# Patient Record
Sex: MALE | Employment: OTHER | URBAN - NONMETROPOLITAN AREA
[De-identification: names, ages, dates, MRNs, and addresses within clinical notes are randomized per-mention and may not be internally consistent; named-entity substitution may affect disease eponyms.]

---

## 2017-06-01 ENCOUNTER — HOSPITAL ENCOUNTER (EMERGENCY)
Facility: HOSPITAL | Age: 72
Discharge: HOME | End: 2017-06-01
Attending: EMERGENCY MEDICINE | Admitting: EMERGENCY MEDICINE
Payer: COMMERCIAL

## 2017-06-01 VITALS
DIASTOLIC BLOOD PRESSURE: 86 MMHG | HEART RATE: 70 BPM | TEMPERATURE: 98 F | RESPIRATION RATE: 16 BRPM | SYSTOLIC BLOOD PRESSURE: 135 MMHG | OXYGEN SATURATION: 98 %

## 2017-06-01 DIAGNOSIS — H10.32 ACUTE BACTERIAL CONJUNCTIVITIS OF LEFT EYE: ICD-10-CM

## 2017-06-01 DIAGNOSIS — G44.319 ACUTE POST-TRAUMATIC HEADACHE, NOT INTRACTABLE: ICD-10-CM
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
## 2017-06-01 DIAGNOSIS — S09.90XA CLOSED HEAD INJURY, INITIAL ENCOUNTER: ICD-10-CM

## 2017-06-01 DIAGNOSIS — S06.0X0A CONCUSSION, WITHOUT LOSS OF CONSCIOUSNESS, INITIAL ENCOUNTER: Primary | ICD-10-CM

## 2017-06-01 PROCEDURE — 96372 THER/PROPH/DIAG INJ SC/IM: CPT

## 2017-06-01 PROCEDURE — 70486 CT MAXILLOFACIAL W/O DYE: CPT | Mod: TC

## 2017-06-01 PROCEDURE — 99284 EMERGENCY DEPT VISIT MOD MDM: CPT | Performed by: EMERGENCY MEDICINE

## 2017-06-01 PROCEDURE — 99284 EMERGENCY DEPT VISIT MOD MDM: CPT | Mod: 25

## 2017-06-01 PROCEDURE — 25000128 H RX IP 250 OP 636: Performed by: EMERGENCY MEDICINE

## 2017-06-01 RX ORDER — ERYTHROMYCIN 5 MG/G
1 OINTMENT OPHTHALMIC 4 TIMES DAILY
Qty: 1 TUBE | Refills: 0 | Status: ON HOLD | OUTPATIENT
Start: 2017-06-01 | End: 2022-08-26

## 2017-06-01 RX ORDER — ACETAMINOPHEN AND CODEINE PHOSPHATE 300; 30 MG/1; MG/1
1-2 TABLET ORAL EVERY 6 HOURS PRN
Qty: 20 TABLET | Refills: 0 | Status: ON HOLD | OUTPATIENT
Start: 2017-06-01 | End: 2022-08-26

## 2017-06-01 RX ORDER — KETOROLAC TROMETHAMINE 30 MG/ML
30 INJECTION, SOLUTION INTRAMUSCULAR; INTRAVENOUS ONCE
Status: COMPLETED | OUTPATIENT
Start: 2017-06-01 | End: 2017-06-01

## 2017-06-01 RX ORDER — HYDROCHLOROTHIAZIDE 25 MG/1
25 TABLET ORAL DAILY
COMMUNITY

## 2017-06-01 RX ADMIN — KETOROLAC TROMETHAMINE 30 MG: 30 INJECTION, SOLUTION INTRAMUSCULAR at 09:41

## 2017-06-01 NOTE — ED NOTES
"Pt comes in with reported headache, eye pain on L side.  Pt had fallen in boat and hit head.  Pt had CT, negative at that time.  Reports dx of \"mild concussion\".  Pt L eye is reddened, watery, painful.  MD immediately at bedside.  Denies any vision changes.    "

## 2017-06-01 NOTE — ED AVS SNAPSHOT
[facility]    [address] [place] [id number]    Phone:  [phone]                                       Bill Almodovar   MRN: [id number]    Department:  [facility]   Date of Visit:  6/1/2017           After Visit Summary Signature Page     I have received my discharge instructions, and my questions have been answered. I have discussed any challenges I see with this plan with the nurse or doctor.    ..........................................................................................................................................  Patient/Patient Representative Signature      ..........................................................................................................................................  Patient Representative Print Name and Relationship to Patient    ..................................................               ................................................  Date                                            Time    ..........................................................................................................................................  Reviewed by Signature/Title    ...................................................              ..............................................  Date                                                            Time

## 2017-06-01 NOTE — ED AVS SNAPSHOT
[facility]    [address]    [place] [address]    Phone:  [phone]                                       Bill Almodovar   MRN: [id number]    Department:  [facility]   Date of Visit:  6/1/2017           Patient Information     Date Of Birth          1945        Your diagnoses for this visit were:     Fall, initial encounter     Closed head injury, initial encounter     Acute post-traumatic headache, not intractable     Acute bacterial conjunctivitis of left eye     Concussion, without loss of consciousness, initial encounter        You were seen by Boo Waggoner MD.      Follow-up Information     Follow up with PCP In 1 week.    Why:  Continuity of care        Discharge Instructions         Physical Problems After Brain Injury  Injury to the brain can affect other parts of the body. As a result, patients may have little or no control over their bodies. Muscles may weaken, tighten, or twitch. Patients may develop sensory problems, problems speaking, hand-eye coordination difficulties, and other problems. Some patients may also have physical injuries that occurred along with the brain injury.    Improving posture and motion  Physical therapists help patients regain movement and strength. Improving posture and range-of-motion exercises improve movement. In addition, they help prepare patients to do tasks. For instance, a patient may work on lifting an arm above the head. This may help the patient dress more easily.  You can help    Show interest. Ask the therapist how you can help    Remind the person to use good posture.    Make sure an affected arm or leg is supported in the proper position.  Reducing swallowing problems  If a person has trouble swallowing, a speech therapist may help a patient increase muscle control in the face, mouth, and throat. The patient may also learn to turn or hold the head in a position that makes swallowing easier and safer.  You can  help    Check with a team member before bringing in food or drink. If the person has a swallowing problem, he or she may be on a special diet.    Limit distractions during meals.  Reducing muscle and joint problems  Damage to the brain may tighten muscles or tendons (contracture). Sometimes an injury causes spasms that jerk or twist affected muscles (spasticity). Range-of-motion or stretching exercises may help control these problems. Sometimes casts or splints are used to hold a joint in proper position. Over time, this may relax the muscle. Sometimes surgery is needed to release tight tissue. If you are diagnosed with spasticity, you may benefit from taking medicines that will help relax the muscles.  You can help    Make sure your loved one does any prescribed exercises or stretches daily.    Be sure the splint is on when it needs to be.  Controlling seizures  If too many signals flood the brain, a seizure may occur. Medicines may control these episodes. Keep in mind that if a patient has multiple, unprovoked seizures, he or she should be evaluated for epilepsy.  You can help  If your loved one has a seizure:    Help the person into a safe position. Make sure your loved one will not fall or hit his or her head.    Do not restrain the person or put anything in his or her mouth.    Tell a team or staff member.    9185-7541 [employer]. [address] [place] [address]. All rights reserved. This information is not intended as a substitute for professional medical care. Always follow your healthcare professional's instructions.             Review of your medicines      START taking        Dose / Directions Last dose taken    acetaminophen-codeine 300-30 MG per tablet   Commonly known as:  TYLENOL/codeine #3   Dose:  1-2 tablet   Quantity:  20 tablet        Take 1-2 tablets by mouth every 6 hours as needed for pain   Refills:  0        erythromycin ophthalmic ointment   Commonly known as:   "ROMYCIN   Dose:  1 Application   Quantity:  1 Tube        Place 1 Application into both eyes 4 times daily   Refills:  0          Our records show that you are taking the medicines listed below. If these are incorrect, please call your family doctor or clinic.        Dose / Directions Last dose taken    HYDROCHLOROTHIAZIDE PO   Dose:  25 mg        Take 25 mg by mouth daily   Refills:  0                Prescriptions were sent or printed at these locations (2 Prescriptions)                   [id number] - [place], [place] - [address] AT [facility] [address], [place] [address]    Telephone:  [phone]   Fax:  [phone]   Hours:                  E-Prescribed (1 of 2)         erythromycin (ROMYCIN) ophthalmic ointment                 Printed at Department/Unit printer (1 of 2)         acetaminophen-codeine (TYLENOL/CODEINE #3) 300-30 MG per tablet                Procedures and tests performed during your visit     Maxillofacial  CT w/o contrast      Orders Needing Specimen Collection     None      Pending Results     Date and Time Order Name Status Description    6/1/2017 0937 Maxillofacial  CT w/o contrast Preliminary             Pending Culture Results     No orders found from 5/30/2017 to 6/2/2017.            Thank you for choosing [facility]       Thank you for choosing [facility] for your care. Our goal is always to provide you with excellent care. Hearing back from our patients is one way we can continue to improve our services. Please take a few minutes to complete the written survey that you may receive in the mail after you visit with us. Thank you!        [employer] Information     [employer] lets you send messages to your doctor, view your test results, renew your prescriptions, schedule appointments and more. To sign up, go to [email or web address] . Click on \"Log in\" on the left side of the screen, which will take you to the Welcome page. Then click on \"Sign up Now\" on the " right side of the page.     You will be asked to enter the access code listed below, as well as some personal information. Please follow the directions to create your username and password.     Your access code is: [email or web address]  Expires: 2017 10:23 AM     Your access code will  in 90 days. If you need help or a new code, please call your [facility] clinic or [phone].        Care EveryWhere ID     This is your Care EveryWhere ID. This could be used by other organizations to access your [facility] medical records  [id number]        After Visit Summary       This is your record. Keep this with you and show to your community pharmacist(s) and doctor(s) at your next visit.

## 2017-06-01 NOTE — ED NOTES
Pt presents with c/o a headache. Pt fell and hit his head Friday, [place].

## 2017-06-01 NOTE — ED NOTES
Pt is alert, oriented at discharge.  Verbalized understanding of all discharge instructions.  No questions or concerns.  t given paper script for tylenol 3.  Eye ointment to [facility] in [place].

## 2017-06-01 NOTE — ED PROVIDER NOTES
History     Chief Complaint   Patient presents with     Headache     HPI  Bill Almodovar is a 72 year old male who presents to the ED with 3 days history of left frontal headache and eye pain.  Denies any nausea, vomiting, visual changes.  Patient fell and hit his head against the edge of a boat while fishing.  This happened 6 days ago.  He was seen in the emergency department at [facility] on Monday and CT scan done was negative.  Patient is also complaining of persistent and worsening left frontal headache, eye pain. Now he is also having left eye discharge.  Denies any fever, neck pain.    I have reviewed the Medications, Allergies, Past Medical and Surgical History, and Social History in the Epic system.    Review of Systems   All other systems reviewed and are negative.      Physical Exam   BP: 139/85  Pulse: 72  Temp: 97.5  F (36.4  C)  Resp: 16  SpO2: 99 %  Physical Exam   Constitutional: He is oriented to person, place, and time. He appears well-developed and well-nourished. No distress.   HENT:   Head: Normocephalic and atraumatic.   Mouth/Throat: Oropharynx is clear and moist. No oropharyngeal exudate.   Eyes: EOM are normal. Pupils are equal, round, and reactive to light. Right eye exhibits no discharge. Left eye exhibits discharge. No scleral icterus.   Neck: No thyromegaly present.   Cardiovascular: Normal heart sounds and intact distal pulses.    Pulmonary/Chest: Effort normal and breath sounds normal. No respiratory distress.   Abdominal: Soft. Bowel sounds are normal. He exhibits no mass. There is no tenderness. There is no rebound and no guarding.   Musculoskeletal: He exhibits no edema or tenderness.   Neurological: He is alert and oriented to person, place, and time.   Skin: Skin is warm. No rash noted. He is not diaphoretic.   Nursing note and vitals reviewed.      ED Course   Evaluated and CT maxillofacial ordered.  Toradol IM given for headache.  10:15 AM-normal CT of    Maxillofacial bones    ED Course     Procedures       Labs Ordered and Resulted from Time of ED Arrival Up to the Time of Departure from the ED - No data to display    Assessments & Plan (with Medical Decision Making)     I have reviewed the nursing notes.    I have reviewed the findings, diagnosis, plan and need for follow up with the patient.    New Prescriptions    ACETAMINOPHEN-CODEINE (TYLENOL/CODEINE #3) 300-30 MG PER TABLET    Take 1-2 tablets by mouth every 6 hours as needed for pain    ERYTHROMYCIN (ROMYCIN) OPHTHALMIC OINTMENT    Place 1 Application into both eyes 4 times daily       Final diagnoses:   Fall, initial encounter   Closed head injury, initial encounter   Acute post-traumatic headache, not intractable   Acute bacterial conjunctivitis of left eye   Concussion, without loss of consciousness, initial encounter   The headache is most likely secondary to concussion, patient discharged home.  Advised continue Tylenol 3 as needed for pain.  Also discharged home on erythromycin eye ointment.    6/1/2017   [facility]     Boo Waggoner MD  06/01/17 8429

## 2017-06-01 NOTE — DISCHARGE INSTRUCTIONS
Physical Problems After Brain Injury  Injury to the brain can affect other parts of the body. As a result, patients may have little or no control over their bodies. Muscles may weaken, tighten, or twitch. Patients may develop sensory problems, problems speaking, hand-eye coordination difficulties, and other problems. Some patients may also have physical injuries that occurred along with the brain injury.    Improving posture and motion  Physical therapists help patients regain movement and strength. Improving posture and range-of-motion exercises improve movement. In addition, they help prepare patients to do tasks. For instance, a patient may work on lifting an arm above the head. This may help the patient dress more easily.  You can help    Show interest. Ask the therapist how you can help    Remind the person to use good posture.    Make sure an affected arm or leg is supported in the proper position.  Reducing swallowing problems  If a person has trouble swallowing, a speech therapist may help a patient increase muscle control in the face, mouth, and throat. The patient may also learn to turn or hold the head in a position that makes swallowing easier and safer.  You can help    Check with a team member before bringing in food or drink. If the person has a swallowing problem, he or she may be on a special diet.    Limit distractions during meals.  Reducing muscle and joint problems  Damage to the brain may tighten muscles or tendons (contracture). Sometimes an injury causes spasms that jerk or twist affected muscles (spasticity). Range-of-motion or stretching exercises may help control these problems. Sometimes casts or splints are used to hold a joint in proper position. Over time, this may relax the muscle. Sometimes surgery is needed to release tight tissue. If you are diagnosed with spasticity, you may benefit from taking medicines that will help relax the muscles.  You can help    Make sure your loved one  does any prescribed exercises or stretches daily.    Be sure the splint is on when it needs to be.  Controlling seizures  If too many signals flood the brain, a seizure may occur. Medicines may control these episodes. Keep in mind that if a patient has multiple, unprovoked seizures, he or she should be evaluated for epilepsy.  You can help  If your loved one has a seizure:    Help the person into a safe position. Make sure your loved one will not fall or hit his or her head.    Do not restrain the person or put anything in his or her mouth.    Tell a team or staff member.    0756-9159 [employer]. [address] [place] [address], [place] [address]. All rights reserved. This information is not intended as a substitute for professional medical care. Always follow your healthcare professional's instructions.

## 2021-11-22 ENCOUNTER — APPOINTMENT (OUTPATIENT)
Dept: URBAN - METROPOLITAN AREA CLINIC 259 | Age: 76
Setting detail: DERMATOLOGY
End: 2021-11-23

## 2021-11-22 DIAGNOSIS — L57.0 ACTINIC KERATOSIS: ICD-10-CM

## 2021-11-22 DIAGNOSIS — Z85.828 PERSONAL HISTORY OF OTHER MALIGNANT NEOPLASM OF SKIN: ICD-10-CM

## 2021-11-22 DIAGNOSIS — L57.8 OTHER SKIN CHANGES DUE TO CHRONIC EXPOSURE TO NONIONIZING RADIATION: ICD-10-CM

## 2021-11-22 PROCEDURE — OTHER COUNSELING: OTHER

## 2021-11-22 PROCEDURE — 99204 OFFICE O/P NEW MOD 45 MIN: CPT | Mod: 25

## 2021-11-22 PROCEDURE — 17000 DESTRUCT PREMALG LESION: CPT

## 2021-11-22 PROCEDURE — OTHER MIPS QUALITY: OTHER

## 2021-11-22 PROCEDURE — OTHER PRESCRIPTION: OTHER

## 2021-11-22 PROCEDURE — OTHER LIQUID NITROGEN: OTHER

## 2021-11-22 PROCEDURE — 17003 DESTRUCT PREMALG LES 2-14: CPT

## 2021-11-22 RX ORDER — FLUOROURACIL 2 G/40G
CREAM TOPICAL BID
Qty: 30 | Refills: 0 | Status: ERX | COMMUNITY
Start: 2021-11-22

## 2021-11-22 ASSESSMENT — LOCATION DETAILED DESCRIPTION DERM
LOCATION DETAILED: LEFT MEDIAL MALAR CHEEK
LOCATION DETAILED: LEFT SCAPHA
LOCATION DETAILED: RIGHT SUPERIOR UPPER BACK
LOCATION DETAILED: LEFT CENTRAL MALAR CHEEK

## 2021-11-22 ASSESSMENT — LOCATION SIMPLE DESCRIPTION DERM
LOCATION SIMPLE: LEFT EAR
LOCATION SIMPLE: LEFT CHEEK
LOCATION SIMPLE: RIGHT UPPER BACK

## 2021-11-22 ASSESSMENT — LOCATION ZONE DERM
LOCATION ZONE: TRUNK
LOCATION ZONE: EAR
LOCATION ZONE: FACE

## 2021-11-22 NOTE — PROCEDURE: LIQUID NITROGEN
Consent: The patient's consent was obtained including but not limited to risks of crusting, scabbing, blistering, scarring, darker or lighter pigmentary change, recurrence, incomplete removal and infection.
Duration Of Freeze Thaw-Cycle (Seconds): 0
Render Post-Care Instructions In Note?: yes
Render In Bullet Format When Appropriate: No
Post-Care Instructions: I reviewed with the patient in detail post-care instructions. Patient is to wear sunprotection, and avoid picking at any of the treated lesions. Pt may apply Vaseline to crusted or scabbing areas.
Detail Level: Zone
Total Number Of Aks Treated: 4

## 2022-04-20 ENCOUNTER — APPOINTMENT (OUTPATIENT)
Dept: URBAN - METROPOLITAN AREA CLINIC 259 | Age: 77
Setting detail: DERMATOLOGY
End: 2022-04-21

## 2022-04-20 DIAGNOSIS — L57.0 ACTINIC KERATOSIS: ICD-10-CM

## 2022-04-20 DIAGNOSIS — L57.8 OTHER SKIN CHANGES DUE TO CHRONIC EXPOSURE TO NONIONIZING RADIATION: ICD-10-CM

## 2022-04-20 PROCEDURE — OTHER COUNSELING: OTHER

## 2022-04-20 PROCEDURE — 17003 DESTRUCT PREMALG LES 2-14: CPT

## 2022-04-20 PROCEDURE — 17000 DESTRUCT PREMALG LESION: CPT

## 2022-04-20 PROCEDURE — OTHER MIPS QUALITY: OTHER

## 2022-04-20 PROCEDURE — OTHER LIQUID NITROGEN: OTHER

## 2022-04-20 PROCEDURE — 99212 OFFICE O/P EST SF 10 MIN: CPT | Mod: 25

## 2022-04-20 ASSESSMENT — LOCATION DETAILED DESCRIPTION DERM
LOCATION DETAILED: MID-FRONTAL SCALP
LOCATION DETAILED: LEFT CENTRAL MALAR CHEEK

## 2022-04-20 ASSESSMENT — LOCATION SIMPLE DESCRIPTION DERM
LOCATION SIMPLE: ANTERIOR SCALP
LOCATION SIMPLE: LEFT CHEEK

## 2022-04-20 ASSESSMENT — LOCATION ZONE DERM
LOCATION ZONE: FACE
LOCATION ZONE: SCALP

## 2022-04-20 NOTE — PROCEDURE: LIQUID NITROGEN
Total Number Of Aks Treated: 10
Duration Of Freeze Thaw-Cycle (Seconds): 0
Consent: The patient's consent was obtained including but not limited to risks of crusting, scabbing, blistering, scarring, darker or lighter pigmentary change, recurrence, incomplete removal and infection.
Render In Bullet Format When Appropriate: No
Post-Care Instructions: I reviewed with the patient in detail post-care instructions. Patient is to wear sunprotection, and avoid picking at any of the treated lesions. Pt may apply Vaseline to crusted or scabbing areas.
Render Post-Care Instructions In Note?: yes
Detail Level: Zone

## 2022-04-20 NOTE — PROCEDURE: MIPS QUALITY
Detail Level: Detailed
Quality 130: Documentation Of Current Medications In The Medical Record: Current Medications Documented
Quality 110: Preventive Care And Screening: Influenza Immunization: Influenza Immunization Ordered or Recommended, but not Administered due to system reason
Quality 431: Preventive Care And Screening: Unhealthy Alcohol Use - Screening: Patient screened for unhealthy alcohol use using a single question and scores less than 2 times per year
Quality 226: Preventive Care And Screening: Tobacco Use: Screening And Cessation Intervention: Patient screened for tobacco use and is an ex/non-smoker
Quality 111:Pneumonia Vaccination Status For Older Adults: Pneumococcal Vaccination not Administered or Previously Received, Reason not Otherwise Specified

## 2022-07-21 ENCOUNTER — APPOINTMENT (OUTPATIENT)
Dept: URBAN - METROPOLITAN AREA CLINIC 259 | Age: 77
Setting detail: DERMATOLOGY
End: 2022-07-21

## 2022-07-21 DIAGNOSIS — Z85.828 PERSONAL HISTORY OF OTHER MALIGNANT NEOPLASM OF SKIN: ICD-10-CM

## 2022-07-21 DIAGNOSIS — L57.0 ACTINIC KERATOSIS: ICD-10-CM

## 2022-07-21 DIAGNOSIS — L72.0 EPIDERMAL CYST: ICD-10-CM

## 2022-07-21 DIAGNOSIS — L57.8 OTHER SKIN CHANGES DUE TO CHRONIC EXPOSURE TO NONIONIZING RADIATION: ICD-10-CM

## 2022-07-21 PROCEDURE — OTHER COUNSELING: OTHER

## 2022-07-21 PROCEDURE — OTHER SUNSCREEN RECOMMENDATIONS: OTHER

## 2022-07-21 PROCEDURE — OTHER LIQUID NITROGEN: OTHER

## 2022-07-21 PROCEDURE — OTHER EXTRACTIONS: OTHER

## 2022-07-21 PROCEDURE — OTHER MIPS QUALITY: OTHER

## 2022-07-21 PROCEDURE — 17003 DESTRUCT PREMALG LES 2-14: CPT

## 2022-07-21 PROCEDURE — 99213 OFFICE O/P EST LOW 20 MIN: CPT | Mod: 25

## 2022-07-21 PROCEDURE — 17000 DESTRUCT PREMALG LESION: CPT

## 2022-07-21 ASSESSMENT — LOCATION ZONE DERM
LOCATION ZONE: SCALP
LOCATION ZONE: FACE
LOCATION ZONE: EYELID

## 2022-07-21 ASSESSMENT — LOCATION DETAILED DESCRIPTION DERM
LOCATION DETAILED: RIGHT MEDIAL FRONTAL SCALP
LOCATION DETAILED: RIGHT SUPERIOR PARIETAL SCALP
LOCATION DETAILED: LEFT CENTRAL MALAR CHEEK
LOCATION DETAILED: RIGHT LATERAL FOREHEAD
LOCATION DETAILED: RIGHT LATERAL CANTHUS
LOCATION DETAILED: LEFT SUPERIOR FOREHEAD
LOCATION DETAILED: RIGHT FOREHEAD
LOCATION DETAILED: LEFT FOREHEAD

## 2022-07-21 ASSESSMENT — LOCATION SIMPLE DESCRIPTION DERM
LOCATION SIMPLE: RIGHT EYELID
LOCATION SIMPLE: RIGHT FOREHEAD
LOCATION SIMPLE: LEFT FOREHEAD
LOCATION SIMPLE: RIGHT SCALP
LOCATION SIMPLE: SCALP
LOCATION SIMPLE: LEFT CHEEK

## 2022-07-21 NOTE — PROCEDURE: EXTRACTIONS
Post-Care Instructions: I reviewed with the patient in detail post-care instructions. Patient is to keep the treatment areas dry overnight, and then apply bacitracin twice daily until healed. Patient may apply hydrogen peroxide soaks to remove any crusting.
Render Post-Care Instructions In Note?: no
Prep Text (Optional): Prior to removal the treatment areas were prepped in the usual fashion.
Acne Type: A milial cyst
Detail Level: Simple
Render Number Of Lesions Treated: yes
Consent was obtained and risks were reviewed including but not limited to scarring, infection, bleeding, scabbing, incomplete removal, and allergy to anesthesia.
Extraction Method: 11 blade

## 2022-07-21 NOTE — PROCEDURE: LIQUID NITROGEN
Render Post-Care Instructions In Note?: no
Total Number Of Aks Treated: 5
Duration Of Freeze Thaw-Cycle (Seconds): 0
Post-Care Instructions: I reviewed with the patient in detail post-care instructions. Patient is to wear sunprotection, and avoid picking at any of the treated lesions. Pt may apply Vaseline to crusted or scabbing areas.
Number Of Freeze-Thaw Cycles: 1 freeze-thaw cycle
Detail Level: Zone
Consent: The patient's consent was obtained including but not limited to risks of crusting, scabbing, blistering, scarring, darker or lighter pigmentary change, recurrence, incomplete removal and infection.

## 2022-07-21 NOTE — PROCEDURE: MIPS QUALITY
Quality 431: Preventive Care And Screening: Unhealthy Alcohol Use - Screening: Patient screened for unhealthy alcohol use using a single question and scores less than 2 times per year
Quality 111:Pneumonia Vaccination Status For Older Adults: Pneumococcal Vaccination not Administered or Previously Received, Reason not Otherwise Specified
Quality 130: Documentation Of Current Medications In The Medical Record: Current Medications Documented
Detail Level: Detailed
Quality 226: Preventive Care And Screening: Tobacco Use: Screening And Cessation Intervention: Patient screened for tobacco use and is an ex/non-smoker
Quality 110: Preventive Care And Screening: Influenza Immunization: Influenza Immunization Ordered or Recommended, but not Administered due to system reason

## 2022-07-21 NOTE — PROCEDURE: COUNSELING
Detail Level: Zone
Detail Level: Generalized
Patient Specific Counseling (Will Not Stick From Patient To Patient): \\nPatient has some post inflammatory hyper and hypo pigmentation.  We discussed that this is due to a combination of sun damage, the effects of multiple treatments with liquid nitrogen, and likely some scar tissue from trauma (he notes that he bumps his head frequently).

## 2022-08-25 ENCOUNTER — APPOINTMENT (OUTPATIENT)
Dept: GENERAL RADIOLOGY | Facility: HOSPITAL | Age: 77
End: 2022-08-25
Attending: EMERGENCY MEDICINE
Payer: COMMERCIAL

## 2022-08-25 ENCOUNTER — APPOINTMENT (OUTPATIENT)
Dept: CT IMAGING | Facility: HOSPITAL | Age: 77
End: 2022-08-25
Attending: EMERGENCY MEDICINE
Payer: COMMERCIAL

## 2022-08-25 ENCOUNTER — HOSPITAL ENCOUNTER (OUTPATIENT)
Facility: HOSPITAL | Age: 77
Setting detail: OBSERVATION
Discharge: HOME | End: 2022-08-27
Attending: EMERGENCY MEDICINE | Admitting: INTERNAL MEDICINE
Payer: COMMERCIAL

## 2022-08-25 DIAGNOSIS — S22.038A OTHER CLOSED FRACTURE OF THIRD THORACIC VERTEBRA, INITIAL ENCOUNTER (H): ICD-10-CM

## 2022-08-25 DIAGNOSIS — S22.028A OTHER CLOSED FRACTURE OF SECOND THORACIC VERTEBRA, INITIAL ENCOUNTER (H): ICD-10-CM

## 2022-08-25 LAB
ALBUMIN SERPL-MCNC: 3.7 G/DL (ref 3.4–5)
ALP SERPL-CCNC: 53 U/L (ref 40–150)
ALT SERPL W P-5'-P-CCNC: 25 U/L (ref 0–70)
ANION GAP SERPL CALCULATED.3IONS-SCNC: 10 MMOL/L (ref 3–14)
APTT PPP: 26 SECONDS (ref 22–38)
AST SERPL W P-5'-P-CCNC: 32 U/L (ref 0–45)
BASOPHILS # BLD AUTO: 0 10E3/UL (ref 0–0.2)
BASOPHILS NFR BLD AUTO: 0 %
BILIRUB SERPL-MCNC: 1 MG/DL (ref 0.2–1.3)
BUN SERPL-MCNC: 17 MG/DL (ref 7–30)
CALCIUM SERPL-MCNC: 9.7 MG/DL (ref 8.5–10.1)
CHLORIDE BLD-SCNC: 105 MMOL/L (ref 94–109)
CO2 SERPL-SCNC: 24 MMOL/L (ref 20–32)
CREAT SERPL-MCNC: 1.19 MG/DL (ref 0.66–1.25)
EOSINOPHIL # BLD AUTO: 0.2 10E3/UL (ref 0–0.7)
EOSINOPHIL NFR BLD AUTO: 1 %
ERYTHROCYTE [DISTWIDTH] IN BLOOD BY AUTOMATED COUNT: 13.2 % (ref 10–15)
ETHANOL SERPL-MCNC: <0.01 G/DL
GFR SERPL CREATININE-BSD FRML MDRD: 64 ML/MIN/1.73M2
GLUCOSE BLD-MCNC: 137 MG/DL (ref 70–99)
HCT VFR BLD AUTO: 45.2 % (ref 40–53)
HGB BLD-MCNC: 16.2 G/DL (ref 13.3–17.7)
HOLD SPECIMEN: NORMAL
HOLD SPECIMEN: NORMAL
IMM GRANULOCYTES # BLD: 0.1 10E3/UL
IMM GRANULOCYTES NFR BLD: 1 %
INR PPP: 1 (ref 0.85–1.15)
LYMPHOCYTES # BLD AUTO: 1.2 10E3/UL (ref 0.8–5.3)
LYMPHOCYTES NFR BLD AUTO: 9 %
MCH RBC QN AUTO: 33.3 PG (ref 26.5–33)
MCHC RBC AUTO-ENTMCNC: 35.8 G/DL (ref 31.5–36.5)
MCV RBC AUTO: 93 FL (ref 78–100)
MONOCYTES # BLD AUTO: 0.8 10E3/UL (ref 0–1.3)
MONOCYTES NFR BLD AUTO: 6 %
NEUTROPHILS # BLD AUTO: 10.8 10E3/UL (ref 1.6–8.3)
NEUTROPHILS NFR BLD AUTO: 83 %
NRBC # BLD AUTO: 0 10E3/UL
NRBC BLD AUTO-RTO: 0 /100
PLATELET # BLD AUTO: 357 10E3/UL (ref 150–450)
POTASSIUM BLD-SCNC: 3 MMOL/L (ref 3.4–5.3)
PROT SERPL-MCNC: 7.4 G/DL (ref 6.8–8.8)
RBC # BLD AUTO: 4.86 10E6/UL (ref 4.4–5.9)
SARS-COV-2 RNA RESP QL NAA+PROBE: NEGATIVE
SODIUM SERPL-SCNC: 139 MMOL/L (ref 133–144)
WBC # BLD AUTO: 13.2 10E3/UL (ref 4–11)

## 2022-08-25 PROCEDURE — 250N000011 HC RX IP 250 OP 636: Performed by: EMERGENCY MEDICINE

## 2022-08-25 PROCEDURE — 82077 ASSAY SPEC XCP UR&BREATH IA: CPT | Performed by: EMERGENCY MEDICINE

## 2022-08-25 PROCEDURE — 36415 COLL VENOUS BLD VENIPUNCTURE: CPT | Performed by: EMERGENCY MEDICINE

## 2022-08-25 PROCEDURE — 82040 ASSAY OF SERUM ALBUMIN: CPT | Performed by: EMERGENCY MEDICINE

## 2022-08-25 PROCEDURE — 72128 CT CHEST SPINE W/O DYE: CPT

## 2022-08-25 PROCEDURE — 72125 CT NECK SPINE W/O DYE: CPT

## 2022-08-25 PROCEDURE — 72170 X-RAY EXAM OF PELVIS: CPT

## 2022-08-25 PROCEDURE — 250N000013 HC RX MED GY IP 250 OP 250 PS 637: Performed by: INTERNAL MEDICINE

## 2022-08-25 PROCEDURE — 96366 THER/PROPH/DIAG IV INF ADDON: CPT | Mod: XU

## 2022-08-25 PROCEDURE — C9803 HOPD COVID-19 SPEC COLLECT: HCPCS

## 2022-08-25 PROCEDURE — 73090 X-RAY EXAM OF FOREARM: CPT | Mod: RT

## 2022-08-25 PROCEDURE — 99219 PR INITIAL OBSERVATION CARE,LEVEL II: CPT | Mod: AI | Performed by: INTERNAL MEDICINE

## 2022-08-25 PROCEDURE — 96375 TX/PRO/DX INJ NEW DRUG ADDON: CPT

## 2022-08-25 PROCEDURE — 36415 COLL VENOUS BLD VENIPUNCTURE: CPT | Performed by: INTERNAL MEDICINE

## 2022-08-25 PROCEDURE — 250N000013 HC RX MED GY IP 250 OP 250 PS 637: Performed by: EMERGENCY MEDICINE

## 2022-08-25 PROCEDURE — 93010 ELECTROCARDIOGRAM REPORT: CPT | Performed by: INTERNAL MEDICINE

## 2022-08-25 PROCEDURE — 85730 THROMBOPLASTIN TIME PARTIAL: CPT | Performed by: EMERGENCY MEDICINE

## 2022-08-25 PROCEDURE — 70450 CT HEAD/BRAIN W/O DYE: CPT

## 2022-08-25 PROCEDURE — 12032 INTMD RPR S/A/T/EXT 2.6-7.5: CPT

## 2022-08-25 PROCEDURE — 84100 ASSAY OF PHOSPHORUS: CPT | Performed by: INTERNAL MEDICINE

## 2022-08-25 PROCEDURE — 83735 ASSAY OF MAGNESIUM: CPT | Performed by: INTERNAL MEDICINE

## 2022-08-25 PROCEDURE — 85610 PROTHROMBIN TIME: CPT | Performed by: EMERGENCY MEDICINE

## 2022-08-25 PROCEDURE — 99285 EMERGENCY DEPT VISIT HI MDM: CPT | Mod: 25

## 2022-08-25 PROCEDURE — 85025 COMPLETE CBC W/AUTO DIFF WBC: CPT | Performed by: EMERGENCY MEDICINE

## 2022-08-25 PROCEDURE — U0005 INFEC AGEN DETEC AMPLI PROBE: HCPCS | Performed by: EMERGENCY MEDICINE

## 2022-08-25 PROCEDURE — 80053 COMPREHEN METABOLIC PANEL: CPT | Performed by: EMERGENCY MEDICINE

## 2022-08-25 PROCEDURE — 96365 THER/PROPH/DIAG IV INF INIT: CPT | Mod: XU

## 2022-08-25 PROCEDURE — 99291 CRITICAL CARE FIRST HOUR: CPT | Mod: 25 | Performed by: EMERGENCY MEDICINE

## 2022-08-25 PROCEDURE — 12032 INTMD RPR S/A/T/EXT 2.6-7.5: CPT | Performed by: EMERGENCY MEDICINE

## 2022-08-25 PROCEDURE — 71045 X-RAY EXAM CHEST 1 VIEW: CPT

## 2022-08-25 PROCEDURE — 684N000002 HC FULL TRAUMA W/O CC LEVEL IV

## 2022-08-25 RX ORDER — ONDANSETRON 4 MG/1
4 TABLET, ORALLY DISINTEGRATING ORAL EVERY 6 HOURS PRN
Status: DISCONTINUED | OUTPATIENT
Start: 2022-08-25 | End: 2022-08-27 | Stop reason: HOSPADM

## 2022-08-25 RX ORDER — ACETAMINOPHEN 10 MG/ML
1000 INJECTION, SOLUTION INTRAVENOUS ONCE
Status: COMPLETED | OUTPATIENT
Start: 2022-08-25 | End: 2022-08-25

## 2022-08-25 RX ORDER — AMOXICILLIN 250 MG
1 CAPSULE ORAL 2 TIMES DAILY PRN
Status: DISCONTINUED | OUTPATIENT
Start: 2022-08-25 | End: 2022-08-27 | Stop reason: HOSPADM

## 2022-08-25 RX ORDER — ONDANSETRON 2 MG/ML
4 INJECTION INTRAMUSCULAR; INTRAVENOUS EVERY 6 HOURS PRN
Status: DISCONTINUED | OUTPATIENT
Start: 2022-08-25 | End: 2022-08-27 | Stop reason: HOSPADM

## 2022-08-25 RX ORDER — AMOXICILLIN 250 MG
2 CAPSULE ORAL 2 TIMES DAILY PRN
Status: DISCONTINUED | OUTPATIENT
Start: 2022-08-25 | End: 2022-08-27 | Stop reason: HOSPADM

## 2022-08-25 RX ORDER — FENTANYL CITRATE 50 UG/ML
50 INJECTION, SOLUTION INTRAMUSCULAR; INTRAVENOUS ONCE
Status: COMPLETED | OUTPATIENT
Start: 2022-08-25 | End: 2022-08-25

## 2022-08-25 RX ORDER — HYDROMORPHONE HCL IN WATER/PF 6 MG/30 ML
0.2 PATIENT CONTROLLED ANALGESIA SYRINGE INTRAVENOUS
Status: DISCONTINUED | OUTPATIENT
Start: 2022-08-25 | End: 2022-08-26

## 2022-08-25 RX ORDER — ACETAMINOPHEN 325 MG/1
650 TABLET ORAL EVERY 6 HOURS PRN
Status: DISCONTINUED | OUTPATIENT
Start: 2022-08-25 | End: 2022-08-27 | Stop reason: HOSPADM

## 2022-08-25 RX ORDER — POTASSIUM CHLORIDE 20MEQ/15ML
40 LIQUID (ML) ORAL ONCE
Status: COMPLETED | OUTPATIENT
Start: 2022-08-25 | End: 2022-08-25

## 2022-08-25 RX ORDER — ACETAMINOPHEN 325 MG/1
975 TABLET ORAL ONCE
Status: COMPLETED | OUTPATIENT
Start: 2022-08-25 | End: 2022-08-25

## 2022-08-25 RX ORDER — ACETAMINOPHEN 650 MG/1
650 SUPPOSITORY RECTAL EVERY 6 HOURS PRN
Status: DISCONTINUED | OUTPATIENT
Start: 2022-08-25 | End: 2022-08-27 | Stop reason: HOSPADM

## 2022-08-25 RX ORDER — HYDROMORPHONE HYDROCHLORIDE 1 MG/ML
0.5 INJECTION, SOLUTION INTRAMUSCULAR; INTRAVENOUS; SUBCUTANEOUS ONCE
Status: COMPLETED | OUTPATIENT
Start: 2022-08-25 | End: 2022-08-25

## 2022-08-25 RX ORDER — AMOXICILLIN 250 MG
1 CAPSULE ORAL 2 TIMES DAILY
Status: DISCONTINUED | OUTPATIENT
Start: 2022-08-26 | End: 2022-08-25

## 2022-08-25 RX ORDER — OXYCODONE HYDROCHLORIDE 5 MG/1
5 TABLET ORAL ONCE
Status: COMPLETED | OUTPATIENT
Start: 2022-08-25 | End: 2022-08-25

## 2022-08-25 RX ORDER — AMOXICILLIN 250 MG
2 CAPSULE ORAL 2 TIMES DAILY
Status: DISCONTINUED | OUTPATIENT
Start: 2022-08-26 | End: 2022-08-25

## 2022-08-25 RX ORDER — MAGNESIUM HYDROXIDE/ALUMINUM HYDROXICE/SIMETHICONE 120; 1200; 1200 MG/30ML; MG/30ML; MG/30ML
30 SUSPENSION ORAL ONCE
Status: COMPLETED | OUTPATIENT
Start: 2022-08-25 | End: 2022-08-25

## 2022-08-25 RX ADMIN — OXYCODONE HYDROCHLORIDE 5 MG: 5 TABLET ORAL at 19:46

## 2022-08-25 RX ADMIN — FENTANYL CITRATE 50 MCG: 50 INJECTION, SOLUTION INTRAMUSCULAR; INTRAVENOUS at 15:21

## 2022-08-25 RX ADMIN — POTASSIUM CHLORIDE 40 MEQ: 20 SOLUTION ORAL at 19:45

## 2022-08-25 RX ADMIN — ACETAMINOPHEN 1000 MG: 10 INJECTION INTRAVENOUS at 15:23

## 2022-08-25 RX ADMIN — ALUMINUM HYDROXIDE, MAGNESIUM HYDROXIDE, AND SIMETHICONE 30 ML: 200; 200; 20 SUSPENSION ORAL at 21:50

## 2022-08-25 RX ADMIN — ACETAMINOPHEN 325MG 975 MG: 325 TABLET ORAL at 19:45

## 2022-08-25 RX ADMIN — HYDROMORPHONE HYDROCHLORIDE 0.5 MG: 1 INJECTION, SOLUTION INTRAMUSCULAR; INTRAVENOUS; SUBCUTANEOUS at 17:34

## 2022-08-25 ASSESSMENT — ACTIVITIES OF DAILY LIVING (ADL)
ADLS_ACUITY_SCORE: 20
ADLS_ACUITY_SCORE: 35

## 2022-08-25 NOTE — ED NOTES
Can you please enter an order for Dermatology, patient has follow up appt in march    Thank you Went in to talk with patient and wife, wife is worried as she states he's never in pain and he's having pain. Neck brace was removed by provider at some point when she was in

## 2022-08-25 NOTE — ED PROVIDER NOTES
History     Chief Complaint   Patient presents with     Trauma     HPI  Bill Almodovar is a 77 year old male who presents with head injury.  Family member was cutting branches from a tree and a branch fell on his head.  The patient does not remember the event and does not recall where he was hit or how he landed.  He reports pain in his head with a large laceration/avulsion of the scalp as well as pain in his upper back between his shoulder blades.  He was brought in by EMS on a backboard, trauma activation was called for elderly head trauma with amnesia.  He is not on any blood thinners.  He has no nausea or vomiting.  He has no numbness or weakness.  He has avulsions on his right forearm.  He otherwise denies any limb pain, chest pain, abdominal pain. Last Td 2020    Allergies:  No Known Allergies    Problem List:    Patient Active Problem List    Diagnosis Date Noted     Head trauma, initial encounter 08/26/2022     Priority: Medium     Syncope, unspecified syncope type 08/26/2022     Priority: Medium     Other closed fracture of third thoracic vertebra, initial encounter (H) 08/25/2022     Priority: Medium     Other closed fracture of second thoracic vertebra, initial encounter (H) 08/25/2022     Priority: Medium        Past Medical History:    No past medical history on file.    Past Surgical History:    No past surgical history on file.    Family History:    No family history on file.    Social History:  Marital Status:   [2]        Medications:    acetaminophen (TYLENOL) 325 MG tablet  hydrochlorothiazide (HYDRODIURIL) 25 MG tablet  Lidocaine (LIDOCARE) 4 % Patch  oxyCODONE (ROXICODONE) 5 MG tablet          Review of Systems   Constitutional: Negative for fever.   HENT: Negative for nosebleeds.    Eyes: Negative for visual disturbance.   Respiratory: Negative for shortness of breath.    Cardiovascular: Negative for chest pain.   Gastrointestinal: Negative for vomiting.   Musculoskeletal: Positive  "for neck pain.   Skin: Positive for wound.   Neurological: Positive for headaches. Negative for weakness and numbness.   Psychiatric/Behavioral: Positive for confusion. Negative for agitation.       Physical Exam   BP: 142/84  Pulse: 81  Temp: 97.8  F (36.6  C)  Resp: 22  Height: 177.8 cm (5' 10\") (per chart review)  Weight: 69.1 kg (152 lb 5.4 oz)  SpO2: 97 %      Physical Exam  Constitutional:       General: He is in acute distress.   HENT:      Head:      Comments: Large avulsion of the skin of the scalp, approximately 7 cm in diameter.  Linear gouged area through subcutaneous tissue to the galea does not violate galea.    Musculoskeletal:      Comments: Mid thoracic midline back tenderness   Skin:     General: Skin is warm and dry.      Comments: Skin tears right arm   Neurological:      Mental Status: He is alert and oriented to person, place, and time.      Comments: Cranial nerves grossly intact.  Moving all extremities with good strength.  Sensation intact to all extremities.         ED Course              ED Course as of 08/30/22 [address] [place] [address] Aug 25, 2022   [phone] Fax: [phone]   Dr Paiz: Tend not to brace endplate fracture, though may benefit if pain is severe, generally attempt to mobilize as tolerate.  If unable to tolerate pain can either admit to hospitalist for pain control or transfer to ED in [place] for nonemergent trauma     [facility]    -Laceration Repair    Date/Time: 8/25/2022 8:11 PM  Performed by: Gris Adams MD  Authorized by: Gris Adams MD     Risks, benefits and alternatives discussed.      ANESTHESIA (see MAR for exact dosages):     Anesthesia method:  Local infiltration    Local anesthetic:  Lidocaine 1% WITH epi  LACERATION DETAILS     Location:  Scalp    Scalp location:  L parietal    Length (cm):  4    Depth (mm):  4    REPAIR TYPE:     Repair type:  Intermediate      EXPLORATION:     Hemostasis achieved with:  Epinephrine and direct pressure    Wound " exploration: wound explored through full range of motion and entire depth of wound probed and visualized      TREATMENT:     Area cleansed with:  Soap and water    Amount of cleaning:  Extensive    Irrigation solution:  Sterile saline    Irrigation volume:  1L     Irrigation method: rinse.    Visualized foreign bodies/material removed: yes      SUBCUTANEOUS REPAIR     Suture size:  3-0    Suture material:  Vicryl    Suture technique:  Horizontal mattress and running    Number of sutures:  13    POST-PROCEDURE DETAILS     Dressing: vaseline gauze, dry gauze, paper tape.        PROCEDURE  Describe Procedure: Large avulsion on scalp, crown and occipital/parietal area.  There is a approximately 4 cm linear area of subcutaneous tissue loss exposing the galea of the skull.  This thread of subcutaneous tissue was adherent to the base of this area, devitalized, and so was trimmed away.  2 horizontal mattress sutures were required to bridge this gap with reduced tension.  Caps were then filled in with running sutures.  Actually obtained good approximation though overlying epidermis is avulsed.  Avulsion at the crown of the head was cleaned and flaps of epidermis were placed over the subcutaneous tissue.  The entire injury was covered with Vaseline gauze and dry gauze and then paper tape.  Patient tolerated the procedure well.  Patient Tolerance:  Patient tolerated the procedure well with no immediate complications                Critical Care time:  was 35 minutes for this patient excluding procedures.  Trauma:  Level of trauma activation: Full  Full Primary and Secondary survey with appropriate immobilization of spine completed in exam section.  C-collar and immobilization: applied prior to arrival.  CSpine Clearance: Patient left in collar  GCS at arrival: 15  GCS at disposition: unchanged  Consults prior to admission or transfer: Orthopedics called  Procedures done in the ED: Laceration repair  Disposition: Admit.  Admission ordered at 910 PM            No results found for this or any previous visit (from the past 24 hour(s)).    Medications   fentaNYL (PF) (SUBLIMAZE) injection 50 mcg (50 mcg Intravenous Given 8/25/22 1521)   acetaminophen (OFIRMEV) infusion 1,000 mg (0 mg Intravenous Stopped 8/25/22 1712)   HYDROmorphone (PF) (DILAUDID) injection 0.5 mg (0.5 mg Intravenous Given 8/25/22 1734)   oxyCODONE (ROXICODONE) tablet 5 mg (5 mg Oral Given 8/25/22 1946)   acetaminophen (TYLENOL) tablet 975 mg (975 mg Oral Given 8/25/22 1945)   potassium chloride (KAYCIEL) solution 40 mEq (40 mEq Oral Given 8/25/22 1945)   alum & mag hydroxide-simethicone (MAALOX) suspension 30 mL (30 mLs Oral Given 8/25/22 2150)   ketorolac (TORADOL) injection 30 mg (30 mg Intramuscular Given 8/27/22 1116)       Assessments & Plan (with Medical Decision Making)     I have reviewed the nursing notes.    I have reviewed the findings, diagnosis, plan and need for follow up with the patient.  Mr Almodovar is a 77-year-old man who presents with head injury after being hit with a tree branch today.  Airway, breathing, circulation intact, GCS 15 on arrival.  Primary survey notable for large avulsion of the scalp.  Initial exam did not reveal thoracic spine tenderness, likely due to distracting injury.  CT head and C-spine were obtained.  Abnormality was noted in part of T-spine and so CT was extended.  Endplate fractures corresponded with area of tenderness on patient's exam.  Contacted Dr. Keenan at [place], main goal is pain control and early mobilization.  I offered patient the option of going home versus being admitted to [facility] versus going to [place] and patient preferred to go home however he was unable to walk due to pain.  Thus preferred to be admitted to the hospital here for pain management, PT/OT evaluation, and tertiary exam by surgery in the morning.    Critical Care Addendum    My initial assessment, based on my review of prehospital  provider report, review of nursing observations, review of vital signs, focused history and physical exam, established that Bill Almodovar has severe traumatic injuries, which requires immediate intervention, and therefore he is critically ill.     After the initial assessment, the care team initiated multiple lab tests, initiated medication therapy with dilaudid and performed CT to provide stabilization care. Due to the critical nature of this patient, I reassessed nursing observations, vital signs, physical exam, mental status and neurologic status multiple times prior to his disposition.     Time also spent performing documentation, reviewing test results and discussion with consultants.     Critical care time (excluding teaching time and procedures): 35 minutes.     Discharge Medication List as of 8/27/2022 11:14 AM      START taking these medications    Details   acetaminophen (TYLENOL) 325 MG tablet Take 2 tablets (650 mg) by mouth every 6 hours as needed for mild pain or other (and adjunct with moderate or severe pain or per patient request), Disp-30 tablet, R-1, E-Prescribe      Lidocaine (LIDOCARE) 4 % Patch Place 3 patches onto the skin every 24 hours To prevent lidocaine toxicity, patient should be patch free for 12 hrs daily.Disp-30 patch, [id number]      oxyCODONE (ROXICODONE) 5 MG tablet Take 1 tablet (5 mg) by mouth every 6 hours as needed for moderate to severe pain, Disp-12 tablet, R-0, Local Print             Final diagnoses:   Other closed fracture of second thoracic vertebra, initial encounter (H)   Other closed fracture of third thoracic vertebra, initial encounter (H)       8/25/2022   [facility]     Gris Adams MD  08/30/22 6411

## 2022-08-26 ENCOUNTER — APPOINTMENT (OUTPATIENT)
Dept: SPEECH THERAPY | Facility: HOSPITAL | Age: 77
End: 2022-08-26
Attending: INTERNAL MEDICINE
Payer: COMMERCIAL

## 2022-08-26 ENCOUNTER — APPOINTMENT (OUTPATIENT)
Dept: GENERAL RADIOLOGY | Facility: HOSPITAL | Age: 77
End: 2022-08-26
Attending: SURGERY
Payer: COMMERCIAL

## 2022-08-26 PROBLEM — R55 SYNCOPE, UNSPECIFIED SYNCOPE TYPE: Status: ACTIVE | Noted: 2022-08-26

## 2022-08-26 PROBLEM — S09.90XA HEAD TRAUMA, INITIAL ENCOUNTER: Status: ACTIVE | Noted: 2022-08-26

## 2022-08-26 LAB
ALBUMIN SERPL-MCNC: 3.3 G/DL (ref 3.4–5)
ALP SERPL-CCNC: 48 U/L (ref 40–150)
ALT SERPL W P-5'-P-CCNC: 33 U/L (ref 0–70)
ANION GAP SERPL CALCULATED.3IONS-SCNC: 4 MMOL/L (ref 3–14)
ANION GAP SERPL CALCULATED.3IONS-SCNC: 5 MMOL/L (ref 3–14)
AST SERPL W P-5'-P-CCNC: 64 U/L (ref 0–45)
BILIRUB DIRECT SERPL-MCNC: 0.3 MG/DL (ref 0–0.2)
BILIRUB SERPL-MCNC: 1.2 MG/DL (ref 0.2–1.3)
BUN SERPL-MCNC: 14 MG/DL (ref 7–30)
BUN SERPL-MCNC: 15 MG/DL (ref 7–30)
CALCIUM SERPL-MCNC: 8.8 MG/DL (ref 8.5–10.1)
CALCIUM SERPL-MCNC: 9 MG/DL (ref 8.5–10.1)
CHLORIDE BLD-SCNC: 104 MMOL/L (ref 94–109)
CHLORIDE BLD-SCNC: 106 MMOL/L (ref 94–109)
CO2 SERPL-SCNC: 29 MMOL/L (ref 20–32)
CO2 SERPL-SCNC: 29 MMOL/L (ref 20–32)
CREAT SERPL-MCNC: 1.03 MG/DL (ref 0.66–1.25)
CREAT SERPL-MCNC: 1.05 MG/DL (ref 0.66–1.25)
ERYTHROCYTE [DISTWIDTH] IN BLOOD BY AUTOMATED COUNT: 13.2 % (ref 10–15)
GFR SERPL CREATININE-BSD FRML MDRD: 74 ML/MIN/1.73M2
GFR SERPL CREATININE-BSD FRML MDRD: 76 ML/MIN/1.73M2
GLUCOSE BLD-MCNC: 130 MG/DL (ref 70–99)
GLUCOSE BLD-MCNC: 141 MG/DL (ref 70–99)
HCT VFR BLD AUTO: 43.8 % (ref 40–53)
HGB BLD-MCNC: 15.6 G/DL (ref 13.3–17.7)
MAGNESIUM SERPL-MCNC: 2.3 MG/DL (ref 1.6–2.3)
MCH RBC QN AUTO: 33.5 PG (ref 26.5–33)
MCHC RBC AUTO-ENTMCNC: 35.6 G/DL (ref 31.5–36.5)
MCV RBC AUTO: 94 FL (ref 78–100)
PHOSPHATE SERPL-MCNC: 2.9 MG/DL (ref 2.5–4.5)
PHOSPHATE SERPL-MCNC: 3 MG/DL (ref 2.5–4.5)
PLATELET # BLD AUTO: 325 10E3/UL (ref 150–450)
POTASSIUM BLD-SCNC: 3.4 MMOL/L (ref 3.4–5.3)
POTASSIUM BLD-SCNC: 3.6 MMOL/L (ref 3.4–5.3)
PROT SERPL-MCNC: 6.9 G/DL (ref 6.8–8.8)
RBC # BLD AUTO: 4.65 10E6/UL (ref 4.4–5.9)
SODIUM SERPL-SCNC: 138 MMOL/L (ref 133–144)
SODIUM SERPL-SCNC: 139 MMOL/L (ref 133–144)
WBC # BLD AUTO: 9.9 10E3/UL (ref 4–11)

## 2022-08-26 PROCEDURE — 250N000013 HC RX MED GY IP 250 OP 250 PS 637: Performed by: NURSE PRACTITIONER

## 2022-08-26 PROCEDURE — 96376 TX/PRO/DX INJ SAME DRUG ADON: CPT

## 2022-08-26 PROCEDURE — 93005 ELECTROCARDIOGRAM TRACING: CPT

## 2022-08-26 PROCEDURE — 92610 EVALUATE SWALLOWING FUNCTION: CPT | Mod: GN

## 2022-08-26 PROCEDURE — 82248 BILIRUBIN DIRECT: CPT | Performed by: INTERNAL MEDICINE

## 2022-08-26 PROCEDURE — 84100 ASSAY OF PHOSPHORUS: CPT | Performed by: INTERNAL MEDICINE

## 2022-08-26 PROCEDURE — 99203 OFFICE O/P NEW LOW 30 MIN: CPT | Performed by: SURGERY

## 2022-08-26 PROCEDURE — G0378 HOSPITAL OBSERVATION PER HR: HCPCS

## 2022-08-26 PROCEDURE — 36415 COLL VENOUS BLD VENIPUNCTURE: CPT | Performed by: INTERNAL MEDICINE

## 2022-08-26 PROCEDURE — 250N000013 HC RX MED GY IP 250 OP 250 PS 637: Performed by: HOSPITALIST

## 2022-08-26 PROCEDURE — 250N000013 HC RX MED GY IP 250 OP 250 PS 637: Performed by: INTERNAL MEDICINE

## 2022-08-26 PROCEDURE — 85027 COMPLETE CBC AUTOMATED: CPT | Performed by: INTERNAL MEDICINE

## 2022-08-26 PROCEDURE — 96375 TX/PRO/DX INJ NEW DRUG ADDON: CPT

## 2022-08-26 PROCEDURE — 250N000009 HC RX 250: Performed by: SURGERY

## 2022-08-26 PROCEDURE — 99225 PR SUBSEQUENT OBSERVATION CARE,LEVEL II: CPT | Performed by: HOSPITALIST

## 2022-08-26 PROCEDURE — 250N000011 HC RX IP 250 OP 636: Performed by: INTERNAL MEDICINE

## 2022-08-26 PROCEDURE — 82247 BILIRUBIN TOTAL: CPT | Performed by: INTERNAL MEDICINE

## 2022-08-26 PROCEDURE — 250N000011 HC RX IP 250 OP 636: Performed by: HOSPITALIST

## 2022-08-26 PROCEDURE — 73030 X-RAY EXAM OF SHOULDER: CPT | Mod: RT

## 2022-08-26 RX ORDER — GINSENG 100 MG
CAPSULE ORAL DAILY
Status: DISCONTINUED | OUTPATIENT
Start: 2022-08-26 | End: 2022-08-27 | Stop reason: HOSPADM

## 2022-08-26 RX ORDER — HYDROCHLOROTHIAZIDE 25 MG/1
25 TABLET ORAL DAILY
Status: DISCONTINUED | OUTPATIENT
Start: 2022-08-26 | End: 2022-08-27 | Stop reason: HOSPADM

## 2022-08-26 RX ORDER — OXYCODONE HYDROCHLORIDE 5 MG/1
5-10 TABLET ORAL EVERY 6 HOURS PRN
Status: DISCONTINUED | OUTPATIENT
Start: 2022-08-26 | End: 2022-08-27 | Stop reason: HOSPADM

## 2022-08-26 RX ORDER — NALOXONE HYDROCHLORIDE 0.4 MG/ML
0.2 INJECTION, SOLUTION INTRAMUSCULAR; INTRAVENOUS; SUBCUTANEOUS
Status: DISCONTINUED | OUTPATIENT
Start: 2022-08-26 | End: 2022-08-27 | Stop reason: HOSPADM

## 2022-08-26 RX ORDER — OXYCODONE HYDROCHLORIDE 5 MG/1
5 TABLET ORAL EVERY 6 HOURS PRN
Status: DISCONTINUED | OUTPATIENT
Start: 2022-08-26 | End: 2022-08-26

## 2022-08-26 RX ORDER — CALCIUM CARBONATE 500 MG/1
1000 TABLET, CHEWABLE ORAL 3 TIMES DAILY PRN
Status: DISCONTINUED | OUTPATIENT
Start: 2022-08-26 | End: 2022-08-27 | Stop reason: HOSPADM

## 2022-08-26 RX ORDER — KETOROLAC TROMETHAMINE 15 MG/ML
15 INJECTION, SOLUTION INTRAMUSCULAR; INTRAVENOUS EVERY 6 HOURS PRN
Status: DISCONTINUED | OUTPATIENT
Start: 2022-08-26 | End: 2022-08-27

## 2022-08-26 RX ORDER — HYDROMORPHONE HYDROCHLORIDE 1 MG/ML
0.5 INJECTION, SOLUTION INTRAMUSCULAR; INTRAVENOUS; SUBCUTANEOUS EVERY 6 HOURS PRN
Status: DISCONTINUED | OUTPATIENT
Start: 2022-08-26 | End: 2022-08-27 | Stop reason: HOSPADM

## 2022-08-26 RX ORDER — ACETAMINOPHEN 325 MG/1
975 TABLET ORAL EVERY 6 HOURS
Status: DISCONTINUED | OUTPATIENT
Start: 2022-08-26 | End: 2022-08-27 | Stop reason: HOSPADM

## 2022-08-26 RX ORDER — LIDOCAINE 4 G/G
1 PATCH TOPICAL
Status: DISCONTINUED | OUTPATIENT
Start: 2022-08-26 | End: 2022-08-26

## 2022-08-26 RX ORDER — LIDOCAINE 4 G/G
3 PATCH TOPICAL
Status: DISCONTINUED | OUTPATIENT
Start: 2022-08-26 | End: 2022-08-27 | Stop reason: HOSPADM

## 2022-08-26 RX ORDER — NALOXONE HYDROCHLORIDE 0.4 MG/ML
0.4 INJECTION, SOLUTION INTRAMUSCULAR; INTRAVENOUS; SUBCUTANEOUS
Status: DISCONTINUED | OUTPATIENT
Start: 2022-08-26 | End: 2022-08-27 | Stop reason: HOSPADM

## 2022-08-26 RX ADMIN — BACITRACIN: 500 OINTMENT TOPICAL at 19:27

## 2022-08-26 RX ADMIN — ACETAMINOPHEN 325MG 650 MG: 325 TABLET ORAL at 01:15

## 2022-08-26 RX ADMIN — Medication 1 PATCH: at 09:35

## 2022-08-26 RX ADMIN — OXYCODONE HYDROCHLORIDE 10 MG: 5 TABLET ORAL at 16:39

## 2022-08-26 RX ADMIN — CALCIUM CARBONATE 1000 MG: 500 TABLET, CHEWABLE ORAL at 22:11

## 2022-08-26 RX ADMIN — ACETAMINOPHEN 325MG 975 MG: 325 TABLET ORAL at 13:40

## 2022-08-26 RX ADMIN — OXYCODONE HYDROCHLORIDE 5 MG: 5 TABLET ORAL at 09:35

## 2022-08-26 RX ADMIN — HYDROMORPHONE HYDROCHLORIDE 0.2 MG: 0.2 INJECTION, SOLUTION INTRAMUSCULAR; INTRAVENOUS; SUBCUTANEOUS at 10:40

## 2022-08-26 RX ADMIN — ACETAMINOPHEN 325MG 650 MG: 325 TABLET ORAL at 08:02

## 2022-08-26 RX ADMIN — KETOROLAC TROMETHAMINE 15 MG: 15 INJECTION, SOLUTION INTRAMUSCULAR; INTRAVENOUS at 13:56

## 2022-08-26 RX ADMIN — BACITRACIN: 500 OINTMENT TOPICAL at 13:49

## 2022-08-26 RX ADMIN — HYDROCHLOROTHIAZIDE 25 MG: 25 TABLET ORAL at 09:35

## 2022-08-26 RX ADMIN — ACETAMINOPHEN 325MG 975 MG: 325 TABLET ORAL at 20:06

## 2022-08-26 RX ADMIN — HYDROMORPHONE HYDROCHLORIDE 0.2 MG: 0.2 INJECTION, SOLUTION INTRAMUSCULAR; INTRAVENOUS; SUBCUTANEOUS at 12:52

## 2022-08-26 ASSESSMENT — ACTIVITIES OF DAILY LIVING (ADL)
ADLS_ACUITY_SCORE: 23
ADLS_ACUITY_SCORE: 23
ADLS_ACUITY_SCORE: 20
ADLS_ACUITY_SCORE: 23
ADLS_ACUITY_SCORE: 23
ADLS_ACUITY_SCORE: 20
ADLS_ACUITY_SCORE: 23
ADLS_ACUITY_SCORE: 23

## 2022-08-26 NOTE — PROGRESS NOTES
08/26/22 1129   Appointment Canceled   Appointment Canceled Pain   Cancel Comments Pt unable to participate in OT this morning due to severe pain. Will attempt again this afternoon as able.   Signing Clinician's Name / Credentials   Signing clinician's name / credentials Marie Leyva OTR/L   Quick Adds   Rehab Discipline OT

## 2022-08-26 NOTE — PROGRESS NOTES
08/26/22 1100   General Information   Onset of Illness/Injury or Date of Surgery 08/25/22   Referring Physician Devin Leigh MD   Patient/Family Therapy Goal Statement (SLP) None stated   Pertinent History of Current Problem Patient reports no difficulty with eating/drinking   General Observations Patient was alert in bed upon therapist's arrival. Patient conversed easily with SLP and used adequate expressive and expressive language.   Past History of Dysphagia Patient denies history of dysphagia   Type of Evaluation   Type of Evaluation Swallow Evaluation   Oral Motor   Oral Musculature generally intact   Structural Abnormalities none present   Mucosal Quality good   Dentition (Oral Motor)   Comment, Dentition (Oral Motor) Good dentition   Dentition (Oral Motor) natural dentition   Facial Symmetry (Oral Motor)   Facial Symmetry (Oral Motor) WNL   Lip Function (Oral Motor)   Lip Range of Motion (Oral Motor) WNL   Lip Strength (Oral Motor) WNL   Tongue Function (Oral Motor)   Tongue Strength (Oral Motor) WNL   Tongue Coordination/Speed (Oral Motor) WNL   Tongue ROM (Oral Motor) WNL   Jaw Function (Oral Motor)   Jaw Function (Oral Motor) WNL   Cough/Swallow/Gag Reflex (Oral Motor)   Soft Palate/Velum (Oral Motor) WNL   Volitional Throat Clear/Cough (Oral Motor) WNL   Volitional Swallow (Oral Motor) WNL   Vocal Quality/Secretion Management (Oral Motor)   Vocal Quality (Oral Motor) WNL   Secretion Management (Oral Motor) WNL   General Swallowing Observations   Respiratory Support (General Swallowing Observations) none   Current Diet/Method of Nutritional Intake (General Swallowing Observations, NIS) thin liquids (level 0);regular diet   Swallowing Evaluation Clinical swallow evaluation   Clinical Swallow Evaluation   Feeding Assistance no assistance needed   Additional evaluation(s) completed today No   Clinical Swallow Evaluation Textures Trialed thin liquids;solid foods   Clinical Swallow Eval: Thin Liquid  Texture Trial   Mode of Presentation, Thin Liquids self-fed;cup   Volume of Liquid or Food Presented 5 oz   Oral Phase of Swallow WFL   Pharyngeal Phase of Swallow intact   Diagnostic Statement Patient swallowed 5 oz of thin liquids. The [place] Swallow protocol was utilized during this assessment to determine the need for an instrumental assessment (VFSS) to further diagnose dysphagia, aspiration and guide the treatment plan.  The patient PASSED the [place] Swallow protocol with no overt signs/symptoms of aspiration/penetration.   Clinical Swallow Evaluation: Solid Food Texture Trial   Mode of Presentation self-fed   Volume Presented 1/2 cracker   Oral Phase WFL   Pharyngeal Phase intact   Diagnostic Statement Patient swallowed 1/2 han cracker. Patient cleared oral cavity by asking for thin liquid wash. Patient with no signs/symptoms of aspiration/penetration observed.   Swallowing Recommendations   Diet Consistency Recommendations thin liquids (level 0);regular diet (IDDSI level 7)   Supervision Level for Intake patient independent   Recommended Feeding/Eating Techniques (Swallow Eval) patient is independent, no specific recommendations   Medication Administration Recommendations, Swallowing (SLP) If patient has difficulty, bury or crush in applesauce.   Instrumental Assessment Recommendations instrumental evaluation not recommended at this time   Comment, Swallowing Recommendations Patient trailed thin liquids and regular textures. No overt signs/symptoms of aspiration/penetration observed during assessment. SLP recommends regular textures (IDDSI level 7) and thin liquids (IDDSI level 0).   Clinical Impression   Criteria for Skilled Therapeutic Interventions Met (SLP Eval) No problems identified which require skilled intervention   Clinical Impression Comments Patient is a 77-year-old man referred to speech therapy by Dr. Leigh. Patient was alert and laying in bed upon SLP's arrival. Patient overall communicated  effectively and appropriately. Patient with adequate oral motor functionality. Patient trailed thin liquids and regular textures. Patient with no overt signs/symptoms of aspiration/penetration on either texture/viscosity. SLP recommends thin liquids and regular textures.   SLP Discharge Planning   SLP Discharge Recommendation   (Defer to other care providers)   SLP Rationale for DC Rec Patient is independent with eating/drinking. Defer to other care providers for discharge recommendations.   SLP Brief overview of current status  Patient presents with functional and intact swallowing abilities. Patient trialed thin liquids and regular textures. No overt signs/symptoms of aspiration/penetration observed. Patient independent with bolus intake. Recommend thin liquids (IDDSI level 0) and regular textures (IDDSI level 7). SLP will complete orders. If difficulties with swallowing occur, new orders should be placed for SLP to re-assess.   Therapy Certification   Start of Care Date 08/26/22   Certification date from 08/26/22   Certification date to 08/26/22    Total Evaluation Time   Total Evaluation Time (Minutes) 15

## 2022-08-26 NOTE — PROGRESS NOTES
08/26/22 1400   Appointment Canceled   Appointment Canceled Pain   Cancel Comments Pt still unable to participate in OT this afternoon due to significant pain. Will attempt again Monday if pt is still here.   Signing Clinician's Name / Credentials   Signing clinician's name / credentials CHRISTIAN Vallejo/L   Quick Adds   Rehab Discipline OT

## 2022-08-26 NOTE — PROGRESS NOTES
Assessment completed with Vivek.    LOC: alert     Dx: Closed Fx of thoracic T2,T3 vertebra  Chronic Disease Management: none on file    Lives with: spouse  Living at:  Home in [place]  Transportation: YES, both drive    Primary PCP: Sharron Avila  Insurance:  [employer] Medicare  Medicare OBS letter reviewed with Vivek.    Support System:  family  Homecare/PCA: no  /County Services:   no  [place]: NO      How was the [facility] notification completed: n/a    Health Care Directive: on file at [facility]  Guardian: no  POA: no    Pharmacy: [facility] [place] [facility] while here and [facility] in [place] when home  Meds management: manages own    Adequate Resources for needs (housing, utilities, food/med): YES  Household chores: shared  Work/community/social activity: YES, gets out as desired    ADLs: independent  Ambulation:independent  Falls: yesterday when a tree fell on him  Nutrition: no concerns  Sleep: sleeps well    Equipment used: none      Oxygen supplier: no      Does the supplier have valid oxygen orders: n/a    Mental health: denies  Substance abuse: vapes  Exposure to violence/abuse: denies  Stressors: denies    Able to Return to Prior Living Arrangements: unknown at this time    Choice of Vendor: unknown    Barriers: unknown    JAD: low    Plan: unknown at this time.    Carol Ann Peralta CM

## 2022-08-26 NOTE — PROGRESS NOTES
08/26/22 1100   Appointment Canceled   Appointment Canceled Pain   Cancel Comments Pt attempted to participate however was unable due to c/o severe R shoulder pain when sitting up. Pt only able to tolerate sitting edge of bed for few seconds before needing to go to supine again due to severe pain. Updated nurse Shelley   Signing Clinician's Name / Credentials   Signing clinician's name / credentials Lo Rojas DPT   Quick Adds   Rehab Discipline PT

## 2022-08-26 NOTE — PLAN OF CARE
Reason for hospital stay:  Head trauma  Living situation PTA: Lives with wife at home in [facility]  Most recent vitals: /89   Pulse 74   Temp 99.1  F (37.3  C) (Tympanic)   Resp 16   Wt 69.1 kg (152 lb 5.4 oz)   SpO2 96%     Pain Management:  Pt declined using dilaudid when offered, but did accept using Tylenol.   LOC:  A/O x3  Cardiac:  WNL  Respiratory:  WNL  GI:  WNL  :  Using urinal at bedside.  Skin Issues:  Multiple skin tears, and laceration/skin tear on head.    IVF:  None.  ABX:  None    Nutrition: general diet  ADL's:  Assist of 1, minimal assist  Ambulation: did not ambulate this shift  Safety:  Call light within reach, pt does call staff for assistance appropriately.     Face to face report given with opportunity to observe patient.    Report given to TAMMI Montez and TAMMI Clifford RN   8/26/2022  7:52 AM        8/26/2022  7:47 AM  Cecilia Ervin RN

## 2022-08-26 NOTE — PROVIDER NOTIFICATION
Patient continues to have significant amount of pain to right shoulder. Pain medication have not worked yet, attempted aqua K pad, ice pack applied. Notified MD of poor pain control. Orders received, see MAR.

## 2022-08-26 NOTE — PROGRESS NOTES
08/26/22 1300   Appointment Canceled   Appointment Canceled Pain   Cancel Comments Pt unable to participate in PT this PM. Pain is worsening. Will attempt tomorrow   Signing Clinician's Name / Credentials   Signing clinician's name / credentials JEFFERY Sullivan Adds   Rehab Discipline PT

## 2022-08-26 NOTE — PLAN OF CARE
Goal Outcome Evaluation:     Patient is A&O, VSS, and very pleasant natured. Patient was in severe pain at 10/10 through the shift and we worked on finding and maintaining an adequate pain regime. Ice packs were used to reduce pain of the back and right shoulder along with Lidocaine patches. PRN Oxycodone, Dilaudid, Tylenol, and Toradol given see MAR. Unable to get out of bed due to pain.  Wounds were cleansed and wound on the right forearm was treated with bacitracin and left open to air. The wound on the scalp was cleansed and covered with a vaseline dressing/foam/ABD pad. Wife remains at bedside. IV saline locked. Makes needs known.    Face to face report given with opportunity to observe patient.    Report given to TAMMI Dudley RN   8/26/2022  3:35 PM

## 2022-08-26 NOTE — CARE PLAN
Prior to Admission Medication Reconciliation:     Medications added:   [x] None  [] As listed below:    Medications deleted:   [] None  [x] As listed below:    Tylenol #3    erythromycin    Medications marked for review/removal by attending:  [x] None  [] As listed below:    Changes made to existing medications:   [] None  [x] Updated strengths and frequencies to most current.   [] As listed below:    Pertinent notes/medications patient takes different than prescribed:     none    Last times/dates taken verified with patient:  [x] Yes- completed myself  [] Prepared PTA medlist for review only. (will not be available to review personally)  [] Did not review with patient. Rx verification only. Review completed by nursing.    [] Nurse completed no changes made (double checked entries)  [] Unable to review with patient at this time:    Allergies listed at another location:  []Allergies match allergies listed in Epic  []Other allergies listed:    Allergy review:    []Did not review: reviewed by nursing  []Did not review: pt unable at this time  [x]Verified current existing allergies or NKDA: no changes made   []Patient confirmed current existing allergies and new allergies added:    Medication reconciliation sources:   [x]Patient  [x]Patient family member/emergency contact: spouse  [x][facility] Report Review: none  []Epic Chart Review  [x]Care Everywhere review:  Medication Sig Dispensed Refills Start Date End Date Status   hydroCHLOROthiazide (HCTZ) 25 mg tablet    Indications: Hypertension, unspecified type Take 1 Tablet (25 mg) by mouth once daily. 90 tablet.   4 11/01/2021   Active       [x]Pharmacy med list/phone call: [facility] [place]    hydrochlorothiazide 25 mg daily last filled 7/20/22 90 ds  []Outside meds dispense report: see below  []Nursing home or Assisted Living MAR:  []Other: **    Pharmacy desired at discharge: [facility]    Is patient on coumadin?   [x]No  []Yes    Requests for consultation by provider  or pharmacist:   [x] Patient understands why all of their meds were prescribed and how to take them. No questions.   [] Managing party has no questions.   [] Patient/ managing party has questions about the following:  [] Did not review with patient. Cannot assess.     Fill dates and reported compliancy:  [x] Fill dates coincide with reported compliancy for all/most maintenance meds.   [] Not applicable. Patient is not taking any maintenance medications at this time.   [] Fill dates do not coincide with compliancy with maintenance meds. See notes in PTA medlist and in comments.    [] Fill dates do not coincide with the following medications but pt reports compliancy:  [] Did not review with patient. Cannot assess.     Historian accuracy:  [x] Excellent- alert and oriented, understands why meds were prescribed and how to take, able to answer specifics  [] Good- alert and oriented, understands why meds were prescribed and how to take, some confusion   [] Fair- alert and oriented, doesn't know medications without list, cannot answer specifics about medications, but has a decent process for which to take at home  [] Poor- does not know medications, may not have a process to take at home, may be cognitively unable to review at this time  []Medication management done by family member or facility, no concerns about historian accuracy.   [] Did not review with patient. Cannot assess.     Medication Management:  [x] Manages meds independently  [] Family member/ other party manages meds/assists:  [] Meds managed by staff at facility  [] Meds set up by home care, family/other party helps administer  [] Meds set up by home care, self administers  [] Did not review with patient. Cannot assess.     Other medications aside from PTA:  [x] Denies taking any medications aside from those listed in PTA meds  [] Reports taking another medication(s) but cannot recall the name(s)  [] Refuses to say.  [] Did not review with patient. Cannot  assess.     Comments: none    Hanh Harvey on 8/26/2022 at 7:44 AM       Notifying appropriate party of changes/additions/discrepancies:  [x]No pertinent changes made, notification not necessary.   [] Notified attending provider via text page/phone call  [] Notified attending provider in person  [] Notified pharmacy  [] Notified nurse  [] Medications have not been reconciled by a provider yet, notification not necessary  [] Pt is not admitted to floor yet, PTA meds completed before admission.     Medications Prior to Admission   Medication Sig Dispense Refill Last Dose     hydrochlorothiazide (HYDRODIURIL) 25 MG tablet Take 25 mg by mouth daily   8/25/2022 at Unknown time

## 2022-08-26 NOTE — H&P
[facility]    History and Physical - Hospitalist Service       Date of Admission:  8/25/2022    Assessment & Plan      Bill Almodovar is a 77 year old male admitted on 8/25/2022. He presents with nondisplaced fracture in the T2 and T3 after a trauma episode.    #1 T2 and T3 nondisplaced fracture  I spoke with Dr. Evans, neurosurgeon on-call at [facility], who evaluated with a CT image and patient's history.  She recommends a conservative therapy with no brace or orthosis.  No heavy lifting of the object greater than 25 pounds for 3-month was recommended; otherwise no restriction in activity.  No follow-up necessary unless symptoms persist or new symptoms occur.    2.  Scalp laceration  Will have general surgery follow the wound    3.  Status post trauma  Will request a general surgery consult to follow for tertiary evaluation in the morning.    4.  Syncopal episode  Patient's syncope is due to head injury from this trauma, but will monitor patient's cardiac rhythm for 24 hours.       Diet: Regular Diet Adult    DVT Prophylaxis: Ambulate every shift  Tong Catheter: Not present  Central Lines: None  Cardiac Monitoring: None  Code Status: Full Code      Clinically Significant Risk Factors Present on Admission                          Disposition Plan        The patient's care was discussed with the Bedside Nurse and Patient.    Devin Leigh MD  Hospitalist Service  [facility]  Securely message with the Vocera Web Console (learn more here)  Text page via [employer] Paging/Directory         ______________________________________________________________________    Chief Complaint   Trauma    History is obtained from the patient    History of Present Illness   Bill Almodovar is a 77 year old male who presents with a head injury.  Patient reports that he and his family member was cutting branches from a tree.  One of the branch of considerable size fell and landed on his head.  He does not  remember exactly what happened but since then he has been experiencing lower neck upper back pain.  Patient denies any preceding palpitation, chest pain, lightheadedness, nausea, vomiting, abdominal pain.  He is not on any blood thinners or antiplatelet therapy.  Patient was brought to the emergency room where laceration in the frontal part of the head scalp was noted and was sutured.  Head CT showed no acute intracranial changes, but cervical and thoracic spine CT showed T2 and T3 nondisplaced fractures.  The case was discussed with the trauma surgery who recommended conservative therapy.  Patient is now being admitted to the hospital for further care.        Review of Systems    The 10 point Review of Systems is negative other than noted in the HPI or here.      Past Medical History    I have reviewed this patient's medical history and updated it with pertinent information if needed.   No past medical history on file.    Past Surgical History   I have reviewed this patient's surgical history and updated it with pertinent information if needed.  No past surgical history on file.    Social History   I have reviewed this patient's social history and updated it with pertinent information if needed.       Family History          Prior to Admission Medications   Prior to Admission Medications   Prescriptions Last Dose Informant Patient Reported? Taking?   HYDROCHLOROTHIAZIDE PO 8/25/2022 at Unknown time  Yes Yes   Sig: Take 25 mg by mouth daily   acetaminophen-codeine (TYLENOL/CODEINE #3) 300-30 MG per tablet   No No   Sig: Take 1-2 tablets by mouth every 6 hours as needed for pain   erythromycin (ROMYCIN) ophthalmic ointment   No No   Sig: Place 1 Application into both eyes 4 times daily      Facility-Administered Medications: None     Allergies   No Known Allergies    Physical Exam   Vital Signs: Temp: 97.8  F (36.6  C) Temp src: Tympanic BP: 145/81 Pulse: 69   Resp: (!) 8 SpO2: 99 %      Weight: 152 lbs 5.41  oz    General Appearance: Lying in bed in no acute distress  Eyes: PERRLA EOMI  HEENT: Clear oropharynx  Respiratory: Clear to auscultation bilaterally   Cardiovascular: S1-S2, regular rhythm and rate, no murmurs rubs or gallops  GI: Soft, nontender, nondistended  Lymph/Hematologic: No palpable lymph nodes  Genitourinary: Deferred  Skin: Scalp laceration under dressing; did not open  Musculoskeletal: Point tenderness in the upper back at around T2 and T3 without any obvious deformities  Neurologic: Alert and oriented x3, cranial nerve exam showed no facial asymmetry, motor examination showed good strength throughout, sensory examination showed intact light touch sensation throughout, deep tendon reflexes were 2+ throughout, coordination was intact  Psychiatric: Appropriate affect    Data   Data reviewed today: I reviewed all medications, new labs and imaging results over the last 24 hours. I personally reviewed the EKG tracing showing Normal sinus rhythm at 65 bpm no acute changes were noted, the head CT image(s) showing No intracranial acute findings and the Cervical and thoracic spine CT image(s) showing T2 and T3 nondisplaced fractures.    Recent Labs   Lab 08/25/22  2339 08/25/22  1455   WBC  --  13.2*   HGB  --  16.2   MCV  --  93   PLT  --  357   INR  --  1.00    139   POTASSIUM 3.6 3.0*   CHLORIDE 104 105   CO2 29 24   BUN 15 17   CR 1.05 1.19   ANIONGAP 5 10   ДМИТРИЙ 9.0 9.7   * 137*   ALBUMIN  --  3.7   PROTTOTAL  --  7.4   BILITOTAL  --  1.0   ALKPHOS  --  53   ALT  --  25   AST  --  32     Recent Results (from the past 24 hour(s))   CT Head w/o Contrast    Narrative    PROCEDURE: CT HEAD W/O CONTRAST     HISTORY: trauma.    COMPARISON: None.    TECHNIQUE:  Helical images of the head from the foramen magnum to the  vertex were obtained without contrast.    FINDINGS: The ventricles and sulci are normal in volume. No acute  intracranial hemorrhage, mass effect, midline shift, hydrocephalus  or  basilar cystern effacement are present.    The grey-white matter interface is preserved.    The calvarium is intact. The mastoid air cells are clear.  There is  mucosal thickening in the maxillary ethmoid and frontal sinuses.      Impression    IMPRESSION: No acute brain abnormality.      MICHELLE MOTT MD         SYSTEM ID:  [id number]   CT Cervical Spine w/o Contrast    Narrative    PROCEDURE: CT CERVICAL SPINE W/O CONTRAST 8/25/2022 3:13 PM    HISTORY: Headache; Trauma, acute/subacute, without suspected cervical  artery trauma    COMPARISONS: None.    Meds/Dose Given:    TECHNIQUE: CT scan of the cervical spine with sagittal coronal  reconstructions    FINDINGS: There are degenerative changes at the middle atlantoaxial  joint.    There is forward subluxation of C3 on C4 and C4 on C5 due to facet  joint degenerative change. There is decrease in height in the C6-C7  discs with anterior and posterior osteophytes. Severe facet joint  degenerative changes are noted worse on the right than the left.    On the very last slice of the study there appears to be an acute  fracture of the T3 vertebral body. A CT of the thoracic spine is  recommended.    There are no fractures of the cervical vertebral bodies or arches.  Atherosclerotic plaquing is seen at the carotid bifurcations         Impression    IMPRESSION: Fracture of the T3 vertebra CT of the thoracic spine is  recommended    MICHELLE MOTT MD         SYSTEM ID:  [id number]   XR Chest Port 1 View    Narrative    PROCEDURE:  XR CHEST PORT 1 VIEW    HISTORY:  Trauma - Chest Injury.     COMPARISON:  None.    FINDINGS:   The cardiac silhouette is normal in size. The pulmonary vasculature is  normal.  The lungs are clear. No pleural effusion or pneumothorax.      Impression    IMPRESSION:  No acute cardiopulmonary disease.      MICHELLE MOTT MD         SYSTEM ID:  [id number]   XR Pelvis Port 1/2 Views    Narrative    PROCEDURE: XR PELVIS PORT 1/2 VIEWS 8/25/2022 3:23  PM    HISTORY: Trauma - Pelvic Injury    COMPARISONS: None.    TECHNIQUE: 1 view    FINDINGS: The pelvis is intact. The sacrum and sacroiliac joints  appear normal. Articular spaces are normal in height at both hips.  Both proximal femurs are intact. Degenerative changes are present in  the lower lumbar spine.         Impression    IMPRESSION: No acute pelvic fracture.    MICHELLE MOTT MD         SYSTEM ID:  [id number]   Radius/Ulna XR, PA & LAT, right    Narrative    PROCEDURE:  XR FOREARM RIGHT 2 VIEWS    HISTORY: fall, trauma    COMPARISON:  None.    TECHNIQUE:  2 views of the right forearm were obtained.    FINDINGS:  No fracture or dislocation is identified. The joint spaces  are preserved.        Impression    IMPRESSION: Normal right forearm      MICHELLE MOTT MD         SYSTEM ID:  [id number]   CT Thoracic Spine w/o Contrast    Narrative    PROCEDURE: CT THORACIC SPINE W/O CONTRAST 8/25/2022 3:43 PM    HISTORY: trauma    COMPARISONS: None.    Meds/Dose Given:    TECHNIQUE: CT scan of the thoracic spine with sagittal and coronal  reconstructions    FINDINGS: There is a nondisplaced fracture of the inferior endplate of  the T2 vertebra not displaced. There is a nondisplaced fracture of the  superior endplate of the T3 vertebra. No involvement of the T2 or T3  vertebral arches is noted.    No fractures are seen in the remainder of the thoracic vertebra.  Anterior osteophytes are seen throughout the mid and lower thoracic  spine. The paravertebral soft tissues are normal.         Impression    IMPRESSION: Nondisplaced fractures of the inferior endplate of T2 and  the superior endplate of T3. Dr. Adams was called with this result  at 3:50 PM    MICHELLE MOTT MD         SYSTEM ID:  [id number]

## 2022-08-26 NOTE — PLAN OF CARE
Goal Outcome Evaluation:  [facility] Inpatient Admission Note:    Patient admitted to 3228/3228-1 at approximately 2245 via wheel chair accompanied by transport tech from emergency room . Report received from Lexie CADENA in SBAR format at 2215 via telephone. Patient ambulated to bed via self.. Patient is alert and oriented X 3, denies pain; rates at 0 on 0-10 scale.  Patient oriented to room, unit, hourly rounding, and plan of care. Explained admission packet and patient handbook with patient bill of rights brochure. Will continue to monitor and document as needed.     Inpatient Nursing criteria listed below was met:    Health care directives status obtained and documented: No    Patient identifies a surrogate decision maker: Yes If yes, who:Vania Contact Information:see face sheet     If initial lactic acid greater than 2.0, repeat lactic acid drawn within one hour of arrival to unit: NA. If no, state reason: n/a    Clergy visit ordered if patient requests: N/A    Skin issues/needs documented: Yes    Isolation Patient: no Education given, correct sign in place and documentation row added to PCS:  No    Fall Prevention N/A: Care plan updated, education given and documented, sticker and magnet in place: Yes    Care Plan initiated: Yes    Education Documented (including assessment): Yes    Patient has discharge needs : No If yes, please explain:n/a      Face to face report given with opportunity to observe patient.    Report given to Cecilia Liriano RN   8/25/2022  11:41 PM

## 2022-08-26 NOTE — PROGRESS NOTES
[facility]    Medicine Progress Note - Hospitalist Service    Date of Admission:  8/25/2022    Assessment & Plan               Bill Almodovar is a 77 year old male admitted on 8/25/2022. He presents with nondisplaced fracture in the T2 and T3 after a trauma episode.    #1 T2 and T3 nondisplaced fracture  CT of thoracic and cervical spine was done showing T2 and was done in ER showing Nondisplaced fractures of the inferior endplate of T2 and the superior endplate of T3. Dr Evans neurosurgery was consulted recs for conservative therpay no brace of orthosis. No heavy lifting for objects > 225 for 3 months. No other restriction. No follow up unless new symptoms or symptoms persist.   No focal deficits  Pt and ot  Surgery consulted  Oxycodone prn for pain    2.  Scalp laceration  repaired in ER  Surgery consulted    3.  Status post trauma  Tree fell on head  -surgery consulted    4.  Syncopal episode  Suspect 2nd form injury  NO focal deficits  Monitor on tele  Pt and ot    HTN  Home hydrochlorothiazide ordered       Diet: Regular Diet Adult    DVT Prophylaxis: Pneumatic Compression Devices  Tong Catheter: Not present  Central Lines: None  Cardiac Monitoring: ACTIVE order. Indication: Syncope- low cardiac risk (24 hours)  Code Status: Full Code      Disposition Plan    pending surgery eval      The patient's care was discussed with the Dr saldana  Consultant.    Ankur Urbina DO  Hospitalist Service  [facility]  Securely message with the Vocera Web Console (learn more here)  Text page via [employer] Paging/Directory         Clinically Significant Risk Factors Present on Admission             # Hypoalbuminemia: Albumin = 3.3 g/dL (Ref range: 3.4 - 5.0 g/dL) on admission, will monitor as appropriate              ______________________________________________________________________    Interval History    patient seen this morning for medical rounds. No chest pain or shortness of breath. Has  laceration scalp that was  Repaired . He does not remember what happened. He was discussed with general surgery and will be seen. Pain controlled     Data reviewed today: I reviewed all medications, new labs and imaging results over the last 24 hours. I personally reviewed no images or EKG's today.    Physical Exam   Vital Signs: Temp: 98.4  F (36.9  C) Temp src: Tympanic BP: 144/76 Pulse: 96   Resp: 16 SpO2: 96 % O2 Device: None (Room air)    Weight: 152 lbs 5.41 oz   Physical Exam  HENT:      Head:      Comments: Scalp bandaged      Right Ear: External ear normal.      Left Ear: External ear normal.      Mouth/Throat:      Pharynx: Oropharynx is clear.   Eyes:      Conjunctiva/sclera: Conjunctivae normal.   Cardiovascular:      Rate and Rhythm: Normal rate.   Pulmonary:      Effort: Pulmonary effort is normal.   Musculoskeletal:         General: Normal range of motion.   Skin:     General: Skin is warm.   Neurological:      Mental Status: He is alert. Mental status is at baseline.           Data   Recent Labs   Lab 08/26/22  0507 08/25/22  2339 08/25/22  1455   WBC 9.9  --  13.2*   HGB 15.6  --  16.2   MCV 94  --  93     --  357   INR  --   --  1.00    138 139   POTASSIUM 3.4 3.6 3.0*   CHLORIDE 106 104 105   CO2 29 29 24   BUN 14 15 17   CR 1.03 1.05 1.19   ANIONGAP 4 5 10   ДМИТРИЙ 8.8 9.0 9.7   * 141* 137*   ALBUMIN 3.3*  --  3.7   PROTTOTAL 6.9  --  7.4   BILITOTAL 1.2  --  1.0   ALKPHOS 48  --  53   ALT 33  --  25   AST 64*  --  32     Recent Results (from the past 24 hour(s))   CT Head w/o Contrast    Narrative    PROCEDURE: CT HEAD W/O CONTRAST     HISTORY: trauma.    COMPARISON: None.    TECHNIQUE:  Helical images of the head from the foramen magnum to the  vertex were obtained without contrast.    FINDINGS: The ventricles and sulci are normal in volume. No acute  intracranial hemorrhage, mass effect, midline shift, hydrocephalus or  basilar cystern effacement are present.    The grey-white  matter interface is preserved.    The calvarium is intact. The mastoid air cells are clear.  There is  mucosal thickening in the maxillary ethmoid and frontal sinuses.      Impression    IMPRESSION: No acute brain abnormality.      MICHELLE MOTT MD         SYSTEM ID:  [id number]   CT Cervical Spine w/o Contrast    Narrative    PROCEDURE: CT CERVICAL SPINE W/O CONTRAST 8/25/2022 3:13 PM    HISTORY: Headache; Trauma, acute/subacute, without suspected cervical  artery trauma    COMPARISONS: None.    Meds/Dose Given:    TECHNIQUE: CT scan of the cervical spine with sagittal coronal  reconstructions    FINDINGS: There are degenerative changes at the middle atlantoaxial  joint.    There is forward subluxation of C3 on C4 and C4 on C5 due to facet  joint degenerative change. There is decrease in height in the C6-C7  discs with anterior and posterior osteophytes. Severe facet joint  degenerative changes are noted worse on the right than the left.    On the very last slice of the study there appears to be an acute  fracture of the T3 vertebral body. A CT of the thoracic spine is  recommended.    There are no fractures of the cervical vertebral bodies or arches.  Atherosclerotic plaquing is seen at the carotid bifurcations         Impression    IMPRESSION: Fracture of the T3 vertebra CT of the thoracic spine is  recommended    MICHELLE MOTT MD         SYSTEM ID:  [id number]   XR Chest Port 1 View    Narrative    PROCEDURE:  XR CHEST PORT 1 VIEW    HISTORY:  Trauma - Chest Injury.     COMPARISON:  None.    FINDINGS:   The cardiac silhouette is normal in size. The pulmonary vasculature is  normal.  The lungs are clear. No pleural effusion or pneumothorax.      Impression    IMPRESSION:  No acute cardiopulmonary disease.      MICHELLE MOTT MD         SYSTEM ID:  [id number]   XR Pelvis Port 1/2 Views    Narrative    PROCEDURE: XR PELVIS PORT 1/2 VIEWS 8/25/2022 3:23 PM    HISTORY: Trauma - Pelvic Injury    COMPARISONS:  None.    TECHNIQUE: 1 view    FINDINGS: The pelvis is intact. The sacrum and sacroiliac joints  appear normal. Articular spaces are normal in height at both hips.  Both proximal femurs are intact. Degenerative changes are present in  the lower lumbar spine.         Impression    IMPRESSION: No acute pelvic fracture.    MICHELLE MOTT MD         SYSTEM ID:  [id number]   Radius/Ulna XR, PA & LAT, right    Narrative    PROCEDURE:  XR FOREARM RIGHT 2 VIEWS    HISTORY: fall, trauma    COMPARISON:  None.    TECHNIQUE:  2 views of the right forearm were obtained.    FINDINGS:  No fracture or dislocation is identified. The joint spaces  are preserved.        Impression    IMPRESSION: Normal right forearm      MICHELLE MOTT MD         SYSTEM ID:  [id number]   CT Thoracic Spine w/o Contrast    Narrative    PROCEDURE: CT THORACIC SPINE W/O CONTRAST 8/25/2022 3:43 PM    HISTORY: trauma    COMPARISONS: None.    Meds/Dose Given:    TECHNIQUE: CT scan of the thoracic spine with sagittal and coronal  reconstructions    FINDINGS: There is a nondisplaced fracture of the inferior endplate of  the T2 vertebra not displaced. There is a nondisplaced fracture of the  superior endplate of the T3 vertebra. No involvement of the T2 or T3  vertebral arches is noted.    No fractures are seen in the remainder of the thoracic vertebra.  Anterior osteophytes are seen throughout the mid and lower thoracic  spine. The paravertebral soft tissues are normal.         Impression    IMPRESSION: Nondisplaced fractures of the inferior endplate of T2 and  the superior endplate of T3. Dr. Adams was called with this result  at 3:50 PM    MICHELLE MOTT MD         SYSTEM ID:  [id number]     Medications

## 2022-08-26 NOTE — CONSULTS
.  CLINIC NOTE - CONSULT  8/26/2022    Patient:Bill Almodovar  Referring Physician: Talib Bermudez MD    Reason for Referral: scalp laceration/abrasion    This is a 77 year old male who was seen in the emergency department yesterday due to a tree limb falling on his head.  He was cutting off branches and next thing he knew, he was laying on the ground with his head hurting.  He was amnestic of the event.  He was seen in the emergency department and evaluated with standard trauma protocol.  Results of lab and CT scan reevaluated the time he was felt to be an adequate candidate for observation at our facility.    This morning, the patient is complaining of some numbness and tingling in his right index and middle finger.  It does not appear to be getting any better.  He also has a small headache and its difficult for him to find a comfortable spot in bed because of his back injury.  He denies any abdominal pain or other extremity pain.  He denies any pain in the lower back.  Thus far, he has not been out of bed and he does not have a c-collar in place.    Past Medical History:  No past medical history on file.    Past Surgical History:  No past surgical history on file.    Family History History:  No family history on file.    History of Tobacco Use:  History   Smoking Status     Not on file   Smokeless Tobacco     Not on file       Current Medications:  No current outpatient medications on file.       Allergies:  No Known Allergies      .REVIEW OF SYSTEMS  General: as above  Eyes: negative for, visual blurring, double vision, eye pain  ENT: negative for, room spinning, discharge; patient does wear hearing aids and says the batteries are dead and he does not have them in place  Resp: No shortness of breath, dyspnea on exertion, cough, or hemoptysis  CV: negative for, palpitations, irregular heart beat, chest pain and exertional chest pain or pressure  GI: negative for, dysphagia, nausea and vomiting  Musculoskeletal:  negative for, neck pain and foot pain  Neurologic: positive for and numbness or tingling of hands, right only  Hematologic: negative    PHYSICAL EXAM:     Vital signs: /76 (BP Location: Right arm, Patient Position: Fowlers)   Pulse 96   Temp 98.4  F (36.9  C) (Tympanic)   Resp 16   Wt 69.1 kg (152 lb 5.4 oz)   SpO2 96%    Weight: [unfilled]   BMI: There is no height or weight on file to calculate BMI.   General: healthy, cooperative, in no acute distress   Skin: Left parietal and frontal region shows abrasion with superficial loss.  There is no evidence of retained foreign body.  There is no active bleeding at this point.   HEENT: PERRLA and Sclera clear, anicteric   Neck: supple, without adenopathy, without thyromegaly, full range of motion   Lungs: clear to auscultation, without wheezes, without crackles   CV: Regular rate and rhythm without murmer   Abdominal: abdomen is soft without significant tenderness, masses, organomegaly or guarding, no rebound tenderness, no guarding or rigidity, no herniae noted, no masses noted   Extremities: No cyanosis, clubbing or edema noted bilaterally in Upper and Lower Extremities,    Neurological: cranial nerves intact, lower motor strength intact and symmetric    ASSESSMENT: Traumatic scalp injury  Numbness of the index and middle finger of the right hand  Traumatic T2-T3 stable fracture      PLAN: Plan and restrictions of set forth by the neurosurgery consult as well as hospitalist service.  As far as the wound is concerned, would recommend soap and water washing and then daily bacitracin.  Once PT/OT has seen the patient and given recommendations the patient passes all milestones then I would be less concerned about sending the patient home.  As an outpatient, the patient can be seen in the wound care center for the scalp abrasion.  We will continue to follow thank you very much for allowing us participate in the care of this pleasant gentleman.

## 2022-08-27 ENCOUNTER — APPOINTMENT (OUTPATIENT)
Dept: PHYSICAL THERAPY | Facility: HOSPITAL | Age: 77
End: 2022-08-27
Attending: INTERNAL MEDICINE
Payer: COMMERCIAL

## 2022-08-27 VITALS
HEART RATE: 71 BPM | SYSTOLIC BLOOD PRESSURE: 157 MMHG | WEIGHT: 152.34 LBS | RESPIRATION RATE: 18 BRPM | TEMPERATURE: 98.4 F | DIASTOLIC BLOOD PRESSURE: 80 MMHG | HEIGHT: 70 IN | BODY MASS INDEX: 21.81 KG/M2 | OXYGEN SATURATION: 95 %

## 2022-08-27 LAB
ANION GAP SERPL CALCULATED.3IONS-SCNC: 6 MMOL/L (ref 3–14)
BASOPHILS # BLD AUTO: 0 10E3/UL (ref 0–0.2)
BASOPHILS NFR BLD AUTO: 0 %
BUN SERPL-MCNC: 15 MG/DL (ref 7–30)
CALCIUM SERPL-MCNC: 9.1 MG/DL (ref 8.5–10.1)
CHLORIDE BLD-SCNC: 105 MMOL/L (ref 94–109)
CO2 SERPL-SCNC: 27 MMOL/L (ref 20–32)
CREAT SERPL-MCNC: 1.12 MG/DL (ref 0.66–1.25)
EOSINOPHIL # BLD AUTO: 0.2 10E3/UL (ref 0–0.7)
EOSINOPHIL NFR BLD AUTO: 2 %
ERYTHROCYTE [DISTWIDTH] IN BLOOD BY AUTOMATED COUNT: 13.2 % (ref 10–15)
GFR SERPL CREATININE-BSD FRML MDRD: 69 ML/MIN/1.73M2
GLUCOSE BLD-MCNC: 114 MG/DL (ref 70–99)
HCT VFR BLD AUTO: 42.9 % (ref 40–53)
HGB BLD-MCNC: 15.3 G/DL (ref 13.3–17.7)
IMM GRANULOCYTES # BLD: 0 10E3/UL
IMM GRANULOCYTES NFR BLD: 0 %
LYMPHOCYTES # BLD AUTO: 1.4 10E3/UL (ref 0.8–5.3)
LYMPHOCYTES NFR BLD AUTO: 17 %
MCH RBC QN AUTO: 33.4 PG (ref 26.5–33)
MCHC RBC AUTO-ENTMCNC: 35.7 G/DL (ref 31.5–36.5)
MCV RBC AUTO: 94 FL (ref 78–100)
MONOCYTES # BLD AUTO: 1 10E3/UL (ref 0–1.3)
MONOCYTES NFR BLD AUTO: 12 %
NEUTROPHILS # BLD AUTO: 5.7 10E3/UL (ref 1.6–8.3)
NEUTROPHILS NFR BLD AUTO: 69 %
NRBC # BLD AUTO: 0 10E3/UL
NRBC BLD AUTO-RTO: 0 /100
PLATELET # BLD AUTO: 298 10E3/UL (ref 150–450)
POTASSIUM BLD-SCNC: 3.5 MMOL/L (ref 3.4–5.3)
RBC # BLD AUTO: 4.58 10E6/UL (ref 4.4–5.9)
SODIUM SERPL-SCNC: 138 MMOL/L (ref 133–144)
WBC # BLD AUTO: 8.3 10E3/UL (ref 4–11)

## 2022-08-27 PROCEDURE — G0378 HOSPITAL OBSERVATION PER HR: HCPCS

## 2022-08-27 PROCEDURE — 85025 COMPLETE CBC W/AUTO DIFF WBC: CPT | Performed by: HOSPITALIST

## 2022-08-27 PROCEDURE — 97161 PT EVAL LOW COMPLEX 20 MIN: CPT | Mod: GP

## 2022-08-27 PROCEDURE — 99213 OFFICE O/P EST LOW 20 MIN: CPT | Performed by: SURGERY

## 2022-08-27 PROCEDURE — 250N000013 HC RX MED GY IP 250 OP 250 PS 637: Performed by: HOSPITALIST

## 2022-08-27 PROCEDURE — 96372 THER/PROPH/DIAG INJ SC/IM: CPT | Performed by: HOSPITALIST

## 2022-08-27 PROCEDURE — 250N000011 HC RX IP 250 OP 636: Performed by: HOSPITALIST

## 2022-08-27 PROCEDURE — 99217 PR OBSERVATION CARE DISCHARGE: CPT | Performed by: HOSPITALIST

## 2022-08-27 PROCEDURE — 80048 BASIC METABOLIC PNL TOTAL CA: CPT | Performed by: HOSPITALIST

## 2022-08-27 PROCEDURE — 36415 COLL VENOUS BLD VENIPUNCTURE: CPT | Performed by: HOSPITALIST

## 2022-08-27 RX ORDER — LIDOCAINE 4 G/G
3 PATCH TOPICAL EVERY 24 HOURS
Qty: 30 PATCH | Refills: 0 | Status: SHIPPED | OUTPATIENT
Start: 2022-08-27

## 2022-08-27 RX ORDER — ACETAMINOPHEN 325 MG/1
650 TABLET ORAL EVERY 6 HOURS PRN
Qty: 30 TABLET | Refills: 1 | Status: SHIPPED | OUTPATIENT
Start: 2022-08-27

## 2022-08-27 RX ORDER — OXYCODONE HYDROCHLORIDE 5 MG/1
5 TABLET ORAL EVERY 6 HOURS PRN
Qty: 12 TABLET | Refills: 0 | Status: SHIPPED | OUTPATIENT
Start: 2022-08-27

## 2022-08-27 RX ORDER — KETOROLAC TROMETHAMINE 15 MG/ML
15 INJECTION, SOLUTION INTRAMUSCULAR; INTRAVENOUS EVERY 6 HOURS PRN
Status: DISCONTINUED | OUTPATIENT
Start: 2022-08-27 | End: 2022-08-27 | Stop reason: HOSPADM

## 2022-08-27 RX ORDER — KETOROLAC TROMETHAMINE 30 MG/ML
30 INJECTION, SOLUTION INTRAMUSCULAR; INTRAVENOUS ONCE
Status: COMPLETED | OUTPATIENT
Start: 2022-08-27 | End: 2022-08-27

## 2022-08-27 RX ADMIN — OXYCODONE HYDROCHLORIDE 10 MG: 5 TABLET ORAL at 08:09

## 2022-08-27 RX ADMIN — BACITRACIN: 500 OINTMENT TOPICAL at 10:40

## 2022-08-27 RX ADMIN — KETOROLAC TROMETHAMINE 30 MG: 30 INJECTION, SOLUTION INTRAMUSCULAR at 11:16

## 2022-08-27 RX ADMIN — OXYCODONE HYDROCHLORIDE 10 MG: 5 TABLET ORAL at 02:27

## 2022-08-27 RX ADMIN — ACETAMINOPHEN 325MG 975 MG: 325 TABLET ORAL at 02:27

## 2022-08-27 RX ADMIN — ACETAMINOPHEN 325MG 975 MG: 325 TABLET ORAL at 08:09

## 2022-08-27 RX ADMIN — HYDROCHLOROTHIAZIDE 25 MG: 25 TABLET ORAL at 08:09

## 2022-08-27 ASSESSMENT — ACTIVITIES OF DAILY LIVING (ADL)
ADLS_ACUITY_SCORE: 23
ADLS_ACUITY_SCORE: 24
ADLS_ACUITY_SCORE: 25
ADLS_ACUITY_SCORE: 24
ADLS_ACUITY_SCORE: 23
ADLS_ACUITY_SCORE: 23

## 2022-08-27 NOTE — PLAN OF CARE
Reason for hospital stay: Head trauma   Most recent vitals: /75 (BP Location: Right arm, Patient Position: Semi-Meek's, Cuff Size: Adult Large)   Pulse 65   Temp 99.1  F (37.3  C) (Oral)   Resp 18   Wt 69.1 kg (152 lb 5.4 oz)   SpO2 94%     Pain Management: Pt reports pain of 8/10 this shift. Patient received PRN 10 mg Oxy - dropped pain to a 6/10. Pt utilizes ice for right arm pain along with lidocaine patches to area     LOC: A&O x4   Cardiac: On Tele: Sinus arrhythmia per ICU nurse report   Respiratory: Lungs clear, on RA   GI: WDL  : WDL, utilizes bedside urinal   Skin Issues: Anterior/Posterior head lacs to scalp. R/L abrasions and bruising    IVF: Saline locked   ABX: Bactracin     Nutrition: tolerating thin liquids well  ADL's: +2 assist with walker and belt   Ambulation: Stood at bedside and ambulated to door and back   Safety: Call light in reach, bed in low position and pt remained free from injuries this shift      - Dressing changed this shift   - Pt received PRN tums for heartburn, reports some relief          Face to face report given with opportunity to observe patient.    Report given to Autumn Hyatt RN   8/26/2022  11:19 PM

## 2022-08-27 NOTE — PLAN OF CARE
"Most recent vitals: /80   Pulse 71   Temp 98.4  F (36.9  C) (Tympanic)   Resp 18   Ht 1.778 m (5' 10\")   Wt 69.1 kg (152 lb 5.4 oz)   SpO2 95%   BMI 21.86 kg/m      Patient is A&O x 4. Neuros intact. Pleasant. Able to make needs known. VSS, afebrile. Assessment and vitals as charted. Pain around a 8-7. Received PRN oxycodone x 1 this shift with slight relief. Did get one time dose of Tordal IM. Refer to MAR.   BS active x 4. No nausea, no vomiting. Assist x 1, uses urinal at bedside.   IV removed. Dressings changed per order.     Face to face report given with opportunity to observe patient.    Report given to TAMMI Dimas.     Bee Garces RN   8/27/2022  11:56 AM                "

## 2022-08-27 NOTE — PROGRESS NOTES
08/27/22 1000   Quick Adds   Type of Visit Initial PT Evaluation   Living Environment   People in Home spouse   Current Living Arrangements house   Home Accessibility no concerns   Transportation Anticipated family or friend will provide   Self-Care   Usual Activity Tolerance excellent   Current Activity Tolerance moderate   Equipment Currently Used at Home none   Fall history within last six months no   General Information   Onset of Illness/Injury or Date of Surgery 08/25/22   Referring Physician Dr. Leigh   Patient/Family Therapy Goals Statement (PT) disharge home   Pertinent History of Current Problem (include personal factors and/or comorbidities that impact the POC) Pt admitted c T1-2 fractures after tree fell on his head   Existing Precautions/Restrictions other (see comments)  (25# lifting restriction)   Cognition   Affect/Mental Status (Cognition) WFL   Orientation Status (Cognition) oriented x 4   Follows Commands (Cognition) WFL   Pain Assessment   Patient Currently in Pain Yes, see Vital Sign flowsheet   Posture    Posture Forward head position   Range of Motion (ROM)   Range of Motion ROM is WFL   ROM Comment BUE/LE ROM is WFL   Strength (Manual Muscle Testing)   Strength (Manual Muscle Testing) strength is WFL   Bed Mobility   Bed Mobility no deficits identified   Transfers   Transfers sit-stand transfer   Sit-Stand Transfer   Sit-Stand [place] (Transfers) supervision   Gait/Stairs (Locomotion)   [place] Level (Gait) contact guard   Distance in Feet (Required for LE Total Joints) 150   Pattern (Gait) swing-through   Deviations/Abnormal Patterns (Gait) base of support, wide   Balance   Balance other (describe)   Standing Balance: Dynamic fair balance   Balance Quick Add Standing balance: dynamic   Sensory Examination   Sensory Perception other (describe)   Sensory Perception Comments Diminished sensation to light touch 2nd and 3rd digits on right   Sensory Perception Quick Adds Light touch    Muscle Tone   Muscle Tone no deficits were identified   Clinical Impression   Criteria for Skilled Therapeutic Intervention Evaluation only   PT Diagnosis (PT) Impaired gait   Influenced by the following impairments Pain limiting function, impaired balance   Functional limitations due to impairments Decreased tolerance for functional mobility   Clinical Presentation (PT Evaluation Complexity) Stable/Uncomplicated   Clinical Presentation Rationale Clinical judgement   Clinical Decision Making (Complexity) moderate complexity   Anticipated Equipment Needs at Discharge (PT) walker, rolling;cane, straight  (Pt declined assistive devices)   Risk & Benefits of therapy have been explained evaluation/treatment results reviewed;patient;spouse/significant other   Clinical Impression Comments PT evaluation completed for discharge recommendation.  Pt was able to tolerate ambulation today with moderately increased pain. Pt demonstrates mildy impaired balance. Recommended cane or walker however pt declined. Pt plans to discharge home today with wife. Hopes to get MRI asap.   PT Discharge Planning   PT Discharge Recommendation (DC Rec) home with assist;home   PT Rationale for DC Rec PT evaluation completed for discharge recommendation.  Pt was able to tolerate ambulation today with moderately increased pain. Pt demonstrates mildy impaired balance. Recommended cane or walker however pt declined. Pt plans to discharge home today with wife. Hopes to get MRI asap   PT Brief overview of current status CGA transfers, ambulation 150'   Total Evaluation Time   Total Evaluation Time (Minutes) 15   Physical Therapy Goals   PT Frequency One time eval and treatment only   PT Predicted Duration/Target Date for Goal Attainment 08/27/22

## 2022-08-27 NOTE — PLAN OF CARE
/79 (BP Location: Right arm, Patient Position: Supine, Cuff Size: Adult Regular)   Pulse 56   Temp 97.4  F (36.3  C) (Tympanic)   Resp 18   Wt 69.1 kg (152 lb 5.4 oz)   SpO2 93%     Pt A&O, afebrile, VS and assessment as charted, A2, free of falls or injury this shift. On tele - sinus arrythmia per charting by ICU RN. 7/10 back and elbow pain, received prn oxy and scheduled tylenol earlier in the shift, declined wanting any more pain medication this AM. Dressing to head CDI - ABD pad changed due to slight saturation as the non adherent film slipped over a bit. Foam to elbow changed this AM, CDI, other scabbed abrasions to arms open to air. Pointer and middle finger numb on R hand - pt states this is how it has been since his trauma. IV SL, some blood dried under dressing. Pt is very [place] without his hearing aids in.     Face to face report given with opportunity to observe patient.    Report given to TAMMI Velez RN   8/27/2022  7:54 AM

## 2022-08-27 NOTE — PROGRESS NOTES
.  INPATIENT ROUNDING NOTE  8/27/2022    Patient: Bill Almodovar    Physician of Record: Talib Bermudez MD    Admitting diagnosis: Other closed fracture of third thoracic vertebra, initial encounter (H) [S22.038A]  Other closed fracture of second thoracic vertebra, initial encounter (H) [S22.028A]    Reason for Admission: Tree versus head     Length of stay: 1    Current Diet: Regular    CURRENT MEDICATIONS:  Continuous Medications:  Current Facility-Administered Medications   Medication Last Rate       Scheduled Medications:  Current Facility-Administered Medications   Medication Dose Route Frequency     acetaminophen  975 mg Oral Q6H     bacitracin   Topical Daily     hydrochlorothiazide  25 mg Oral Daily     lidocaine  3 patch Transdermal Q24H     lidocaine   Transdermal Q8H BRAXTON       PRN Medications:  Current Facility-Administered Medications   Medication Dose Route Frequency     acetaminophen  650 mg Oral Q6H PRN    Or     acetaminophen  650 mg Rectal Q6H PRN     calcium carbonate  1,000 mg Oral TID PRN     HYDROmorphone  0.5 mg Intravenous Q6H PRN     ketorolac  15 mg Intravenous Q6H PRN     melatonin  1 mg Oral At Bedtime PRN     naloxone  0.2 mg Intravenous Q2 Min PRN    Or     naloxone  0.4 mg Intravenous Q2 Min PRN    Or     naloxone  0.2 mg Intramuscular Q2 Min PRN    Or     naloxone  0.4 mg Intramuscular Q2 Min PRN     ondansetron  4 mg Oral Q6H PRN    Or     ondansetron  4 mg Intravenous Q6H PRN     oxyCODONE  5-10 mg Oral Q6H PRN     senna-docusate  1 tablet Oral BID PRN    Or     senna-docusate  2 tablet Oral BID PRN       SUBJECTIVE:   Nausea: No. Vomiting: No. Fever: No. Chills: No. Excessive burping: No. Flatus: Yes. BM: Yes. Pain is 4/10. Pain control: good. Tolerating current diet: Yes.  Patient is doing better today.  He says he still is having some rotation of his right shoulder.  X-rays from yesterday showed no evidence of fracture however, he does have fairly significant degenerative joint  "disease in his C-spine.  Of note, the patient is mentioning that he has progressive numbness in his index and middle finger on the right.  It is moved from the tip of the digits to the mid phalanx.  He has no restrictions on strength or  but sensation is absent.  Patient and his wife are also wondering if we feel comfortable in letting him go back to the [facility] today.  They are from there and they are interested in further evaluation if needed by their primary care physician.    PHYSICAL EXAM:   Vital signs: /80   Pulse 71   Temp 98.4  F (36.9  C) (Tympanic)   Resp 18   Ht 1.778 m (5' 10\")   Wt 69.1 kg (152 lb 5.4 oz)   SpO2 95%   BMI 21.86 kg/m     Weight: [unfilled]   BMI: Body mass index is 21.86 kg/m .   General: Normal, healthy, cooperative, in no acute distress, alert   Neck: supple, without adenopathy, without thyromegaly, full range of motion   Lungs: clear to auscultation, without wheezes, without crackles   CV: Regular rate and rhythm without murmer   Abdominal: Abdomen soft, non-tender. BS normal. No masses, organomegaly   Extremities: Decreased range of motion of his right shoulder compared to his left due to pain in the shoulder area.  He does have 5/5  strength on the left and 4/5  strength on the right.      INPUT/OUTPUT:      Intake/Output Summary (Last 24 hours) at 8/27/2022 1012  Last data filed at 8/26/2022 2008  Gross per 24 hour   Intake 240 ml   Output 140 ml   Net 100 ml       I/O last 3 completed shifts:  In: 240 [P.O.:240]  Out: 390 [Urine:390]    LABS:    Last CBC Rrsults:   Recent Labs   Lab Test 08/27/22  0631 08/26/22  0507 08/25/22  1455   WBC 8.3 9.9 13.2*   RBC 4.58 4.65 4.86   HGB 15.3 15.6 16.2   HCT 42.9 43.8 45.2   MCV 94 94 93   MCH 33.4* 33.5* 33.3*   MCHC 35.7 35.6 35.8   RDW 13.2 13.2 13.2    325 357       Last Comprehensive Metabolic panel:  Recent Labs   Lab Test 08/27/22  0631 08/26/22  0507 08/25/22  2339 08/25/22  1455    139 138 " 139   POTASSIUM 3.5 3.4 3.6 3.0*   CHLORIDE 105 106 104 105   CO2 27 29 29 24   ANIONGAP 6 4 5 10   * 130* 141* 137*   BUN 15 14 15 17   CR 1.12 1.03 1.05 1.19   GFRESTIMATED 69 76 74 64   ДМИТРИЙ 9.1 8.8 9.0 9.7   BILITOTAL  --  1.2  --  1.0   ALKPHOS  --  48  --  53   ALT  --  33  --  25   AST  --  64*  --  32       Recent Labs   Lab Test 08/26/22  0507 08/25/22  2339 08/25/22  1455   MAG  --  2.3  --    ALBUMIN 3.3*  --  3.7   PHOS 2.9 3.0  --        RADIOLOGY:  4 view x-rays of his right shoulder shows no evidence of subluxation, fracture or dislocations.  Does show moderate tosevere degenerative joint disease in the cervical spine.    ASSESSMENT:    77 year old male admitted for Other closed fracture of third thoracic vertebra, initial encounter (H) [S22.038A]  Other closed fracture of second thoracic vertebra, initial encounter (H) [S22.028A].    Paresthesias in the right index and middle finger    PLAN: From trauma service point of view the patient is safe to be discharged home.  I did research into having an MRI of his C-spine which I feel is indicated at this point.  However, due to limitations locally this is not offered until Monday.  I see minimal risk for the patient to be transported back home.  Also quite adamant that the patient was not to be the .  His wife is quite capable and also was adamant that he is not going to be the .  His wife is already been on the phone with her home medical doctor and trying to set up an MRI to be done in the [facility] when they return.  I did remind them about the dressing change on a daily basis and triple antibiotic ointment of his scalp.

## 2022-08-27 NOTE — PLAN OF CARE
Goal Outcome Evaluation:  Took over this pts care at 11:00 am.  Tele removed for discharge and discharge instructions reviewed with pt and his wife.  Supplies for dressing changes sent with pt.  Pt discharged via wheelchair.      Patient discharged at 12:10 PM via wheel chair accompanied by spouse and staff. Prescriptions sent to patients preferred pharmacy and script for pain med given to pt to bring to pharmacy  All belongings sent with patient.     Discharge instructions reviewed with pt and his wife. Listed belongings gathered and returned to patient. yes    Patient discharged to home.   Report called to na    Surgical Patient   Surgical Procedures during stay: no  Did patient receive discharge instruction on wound care and recognition of infection symptoms? Yes    MISC  Follow up appointment made:   wife made f/u appt with PCP for Monday.  Referral for MRI sent with pt to bring to PCP to order per Dr. Bermudez .  Home medications returned to patient: N/A  Patient reports pain was well managed at discharge: Yes

## 2022-08-27 NOTE — DISCHARGE INSTRUCTIONS
For thoracic fracture  No heavy lifting of the object greater than 25 pounds for 3-month was recommended; otherwise no restriction in activity.  No follow-up necessary unless symptoms persist or new symptoms occur.  For tingling of right hand will need MRI of neck to be ordered by PCP.  Needs follow up with primary care next week please call their office

## 2022-08-29 NOTE — DISCHARGE SUMMARY
[facility]  Hospitalist Discharge Summary      Date of Admission:  8/25/2022  Date of Discharge:  8/27/2022 12:10 PM  Discharging Provider: Ankur Urbina DO  Discharge Service: Hospitalist Service    Discharge Diagnoses      #1 T2 and T3 nondisplaced fracture  2.  Scalp laceration  3.  Status post trauma  4.Tree fell on head  5.  Syncopal episode  HTN       Follow-ups Needed After Discharge   Follow-up Appointments     Follow-up and recommended labs and tests       Follow up with primary care provider, Sharron Avila, within 7 days to   evaluate medication change and for hospital follow- up.  No follow up labs   or test are needed.           Need EDMOND of cervical spine     Unresulted Labs Ordered in the Past 30 Days of this Admission     No orders found from 7/26/2022 to 8/26/2022.           Discharge Disposition   Discharged to home  Condition at discharge: Stable       Hospital Course      Bill Almodovar is a 77 year old male admitted on 8/25/2022. He presented with nondisplaced fracture in the T2 and T3 after a trauma episode.  Patient was cutting a tree down and it snapped and fell on him he did not remember what happened.  Patient was brought to the emergency room he did have ct head no acute findings.  CT done of his thoracic spine showing CT of thoracic and cervical spine was done showing T2 and was done in ER showing Nondisplaced fractures of the inferior endplate of T2 and the superior endplate of T3. Dr Evans neurosurgery was consulted recs for conservative therpay no brace of orthosis. No heavy lifting for objects > 25# for 3 months. No other restriction. No follow up unless new symptoms or symptoms persist.  Did have some numbness in his first and second digits, he was seen by general surgery for trauma evaluation and was cleared with recommendation for MRI of his cervical spine which can be done outpatient.  Also was treated for scalp laceration in the emergency room and required  sutures.  He did not have any more syncopal events in the hospital he was cleared by physical therapy.  Patient was on telemetry and had sinus rhythm.  We did obtain x-ray of the right shoulder as he complained of pain and showed advanced cervical spine degenerative changes around the cervical spine which were thought to be secondary cause for his numbness in the tip of his fingers.  Per Dr. Bermudez he can have MRI when he is back to [place] and follow-up with his primary care doctor.  I did give instructions that will need to be followed up on discharge.  He was given prescription for narcotics and will need to follow-up primary care on Monday.  His wife and patient wanted to discharge.     Consultations This Hospital Stay   SURGERY GENERAL IP CONSULT  SURGERY GENERAL IP CONSULT  PHYSICAL THERAPY ADULT IP CONSULT  OCCUPATIONAL THERAPY ADULT IP CONSULT  SPEECH LANGUAGE PATH ADULT IP CONSULT  SURGERY GENERAL IP CONSULT    Code Status   Prior    Time Spent on this Encounter   IAnkur DO, personally saw the patient today and spent greater than 30 minutes discharging this patient.       Ankur Urbina DO  [facility]  [address] [place] [id number]  Phone: [phone]  Fax: [phone]  ______________________________________________________________________    Physical Exam   Vital Signs:                    Weight: 152 lbs 5.41 oz        HENT:      Head:      Comments: Scalp bandaged      Right Ear: External ear normal.      Left Ear: External ear normal.      Mouth/Throat:      Pharynx: Oropharynx is clear.   Eyes:      Conjunctiva/sclera: Conjunctivae normal.   Cardiovascular:      Rate and Rhythm: Normal rate.   Pulmonary:      Effort: Pulmonary effort is normal.   Musculoskeletal:         General: Normal range of motion.   Skin:     General: Skin is warm.   Neurological:      Mental Status: He is alert. Mental status is at baseline.   Primary Care Physician   Sharron CHU  [place]    Discharge Orders      Adult General Surg Referral      Reason for your hospital stay    Syncope, head trauma, spine fracture     Follow-up and recommended labs and tests     Follow up with primary care provider, Sharron Avila, within 7 days to evaluate medication change and for hospital follow- up.  No follow up labs or test are needed.     Activity    Your activity upon discharge: no heavy lifting < 25 # for 12 weeks     Diet    Follow this diet upon discharge: Orders Placed This Encounter      Regular Diet Adult Thin Liquids (level 0)       Significant Results and Procedures   Most Recent 3 CBC's:Recent Labs   Lab Test 08/27/22  0631 08/26/22  0507 08/25/22  1455   WBC 8.3 9.9 13.2*   HGB 15.3 15.6 16.2   MCV 94 94 93    325 357     Most Recent 3 BMP's:Recent Labs   Lab Test 08/27/22  0631 08/26/22  0507 08/25/22  2339    139 138   POTASSIUM 3.5 3.4 3.6   CHLORIDE 105 106 104   CO2 27 29 29   BUN 15 14 15   CR 1.12 1.03 1.05   ANIONGAP 6 4 5   ДМИТРИЙ 9.1 8.8 9.0   * 130* 141*   ,   Results for orders placed or performed during the hospital encounter of 08/25/22   XR Chest Port 1 View    Narrative    PROCEDURE:  XR CHEST PORT 1 VIEW    HISTORY:  Trauma - Chest Injury.     COMPARISON:  None.    FINDINGS:   The cardiac silhouette is normal in size. The pulmonary vasculature is  normal.  The lungs are clear. No pleural effusion or pneumothorax.      Impression    IMPRESSION:  No acute cardiopulmonary disease.      MICHELLE MOTT MD         SYSTEM ID:  [id number]   XR Pelvis Port 1/2 Views    Narrative    PROCEDURE: XR PELVIS PORT 1/2 VIEWS 8/25/2022 3:23 PM    HISTORY: Trauma - Pelvic Injury    COMPARISONS: None.    TECHNIQUE: 1 view    FINDINGS: The pelvis is intact. The sacrum and sacroiliac joints  appear normal. Articular spaces are normal in height at both hips.  Both proximal femurs are intact. Degenerative changes are present in  the lower lumbar spine.         Impression     IMPRESSION: No acute pelvic fracture.    MICHELLE MOTT MD         SYSTEM ID:  [id number]   CT Cervical Spine w/o Contrast    Narrative    PROCEDURE: CT CERVICAL SPINE W/O CONTRAST 8/25/2022 3:13 PM    HISTORY: Headache; Trauma, acute/subacute, without suspected cervical  artery trauma    COMPARISONS: None.    Meds/Dose Given:    TECHNIQUE: CT scan of the cervical spine with sagittal coronal  reconstructions    FINDINGS: There are degenerative changes at the middle atlantoaxial  joint.    There is forward subluxation of C3 on C4 and C4 on C5 due to facet  joint degenerative change. There is decrease in height in the C6-C7  discs with anterior and posterior osteophytes. Severe facet joint  degenerative changes are noted worse on the right than the left.    On the very last slice of the study there appears to be an acute  fracture of the T3 vertebral body. A CT of the thoracic spine is  recommended.    There are no fractures of the cervical vertebral bodies or arches.  Atherosclerotic plaquing is seen at the carotid bifurcations         Impression    IMPRESSION: Fracture of the T3 vertebra CT of the thoracic spine is  recommended    MICHELLE MOTT MD         SYSTEM ID:  [id number]   CT Head w/o Contrast    Narrative    PROCEDURE: CT HEAD W/O CONTRAST     HISTORY: trauma.    COMPARISON: None.    TECHNIQUE:  Helical images of the head from the foramen magnum to the  vertex were obtained without contrast.    FINDINGS: The ventricles and sulci are normal in volume. No acute  intracranial hemorrhage, mass effect, midline shift, hydrocephalus or  basilar cystern effacement are present.    The grey-white matter interface is preserved.    The calvarium is intact. The mastoid air cells are clear.  There is  mucosal thickening in the maxillary ethmoid and frontal sinuses.      Impression    IMPRESSION: No acute brain abnormality.      MICHELLE MOTT MD         SYSTEM ID:  [id number]   Radius/Ulna XR, PA & LAT, right     Narrative    PROCEDURE:  XR FOREARM RIGHT 2 VIEWS    HISTORY: fall, trauma    COMPARISON:  None.    TECHNIQUE:  2 views of the right forearm were obtained.    FINDINGS:  No fracture or dislocation is identified. The joint spaces  are preserved.        Impression    IMPRESSION: Normal right forearm      MICHELLE MOTT MD         SYSTEM ID:  [id number]   CT Thoracic Spine w/o Contrast    Narrative    PROCEDURE: CT THORACIC SPINE W/O CONTRAST 8/25/2022 3:43 PM    HISTORY: trauma    COMPARISONS: None.    Meds/Dose Given:    TECHNIQUE: CT scan of the thoracic spine with sagittal and coronal  reconstructions    FINDINGS: There is a nondisplaced fracture of the inferior endplate of  the T2 vertebra not displaced. There is a nondisplaced fracture of the  superior endplate of the T3 vertebra. No involvement of the T2 or T3  vertebral arches is noted.    No fractures are seen in the remainder of the thoracic vertebra.  Anterior osteophytes are seen throughout the mid and lower thoracic  spine. The paravertebral soft tissues are normal.         Impression    IMPRESSION: Nondisplaced fractures of the inferior endplate of T2 and  the superior endplate of T3. Dr. Adams was called with this result  at 3:50 PM    MICHELLE MOTT MD         SYSTEM ID:  [id number]   XR Shoulder Right Port G/E 2 Views    Narrative    PROCEDURE:  XR SHOULDER RIGHT PORT G/E 2 VIEWS    HISTORY: shoulder pain and right finger numbness.    COMPARISON:  None.    TECHNIQUE:  4 views right shoulder.    FINDINGS:  No fracture or dislocation is identified. Mild glenohumeral  osteophytosis is seen. Moderate hypertrophy is present at the  acromioclavicular joint. There are advanced degenerative changes  partially seen in the cervical spine. No foreign body is seen.       Impression    IMPRESSION: Advanced cervical spine degenerative changes.      CAREY DIXON MD         SYSTEM ID:  [id number]       Discharge Medications   Discharge Medication List as of  8/27/2022 11:14 AM      START taking these medications    Details   acetaminophen (TYLENOL) 325 MG tablet Take 2 tablets (650 mg) by mouth every 6 hours as needed for mild pain or other (and adjunct with moderate or severe pain or per patient request), Disp-30 tablet, R-1, E-Prescribe      Lidocaine (LIDOCARE) 4 % Patch Place 3 patches onto the skin every 24 hours To prevent lidocaine toxicity, patient should be patch free for 12 hrs daily.Disp-30 patch, [id number]      oxyCODONE (ROXICODONE) 5 MG tablet Take 1 tablet (5 mg) by mouth every 6 hours as needed for moderate to severe pain, Disp-12 tablet, R-0, Local Print         CONTINUE these medications which have NOT CHANGED    Details   hydrochlorothiazide (HYDRODIURIL) 25 MG tablet Take 25 mg by mouth daily, Historical           Allergies   No Known Allergies

## 2022-08-30 ASSESSMENT — ENCOUNTER SYMPTOMS
WOUND: 1
NECK PAIN: 1
WEAKNESS: 0
NUMBNESS: 0
CONFUSION: 1
VOMITING: 0
FEVER: 0
SHORTNESS OF BREATH: 0
HEADACHES: 1
AGITATION: 0

## 2022-09-01 ENCOUNTER — TELEPHONE (OUTPATIENT)
Dept: CASE MANAGEMENT | Facility: HOSPITAL | Age: 77
End: 2022-09-01

## 2023-07-27 ENCOUNTER — APPOINTMENT (OUTPATIENT)
Dept: URBAN - METROPOLITAN AREA CLINIC 259 | Age: 78
Setting detail: DERMATOLOGY
End: 2023-07-29

## 2023-07-27 VITALS — WEIGHT: 144 LBS | HEIGHT: 70 IN

## 2023-07-27 DIAGNOSIS — L57.0 ACTINIC KERATOSIS: ICD-10-CM

## 2023-07-27 PROCEDURE — OTHER LIQUID NITROGEN: OTHER

## 2023-07-27 PROCEDURE — OTHER MIPS QUALITY: OTHER

## 2023-07-27 PROCEDURE — OTHER ADDITIONAL NOTES: OTHER

## 2023-07-27 PROCEDURE — 17000 DESTRUCT PREMALG LESION: CPT

## 2023-07-27 PROCEDURE — 17003 DESTRUCT PREMALG LES 2-14: CPT

## 2023-07-27 PROCEDURE — OTHER COUNSELING: OTHER

## 2023-07-27 ASSESSMENT — LOCATION SIMPLE DESCRIPTION DERM
LOCATION SIMPLE: LEFT TEMPLE
LOCATION SIMPLE: RIGHT EAR
LOCATION SIMPLE: LEFT EAR

## 2023-07-27 ASSESSMENT — LOCATION DETAILED DESCRIPTION DERM
LOCATION DETAILED: RIGHT SUPERIOR HELIX
LOCATION DETAILED: LEFT LATERAL TEMPLE
LOCATION DETAILED: LEFT SUPERIOR HELIX
LOCATION DETAILED: LEFT INFERIOR HELIX

## 2023-07-27 ASSESSMENT — LOCATION ZONE DERM
LOCATION ZONE: FACE
LOCATION ZONE: EAR

## 2023-07-27 NOTE — PROCEDURE: LIQUID NITROGEN
Show Applicator Variable?: Yes
Render Note In Bullet Format When Appropriate: No
Duration Of Freeze Thaw-Cycle (Seconds): 3
Post-Care Instructions: I reviewed with the patient in detail post-care instructions. Patient is to wear sunprotection, and avoid picking at any of the treated lesions. Pt may apply Vaseline to crusted or scabbing areas.
Detail Level: Detailed
Number Of Freeze-Thaw Cycles: 3 freeze-thaw cycles
Consent: The patient's consent was obtained including but not limited to risks of crusting, scabbing, blistering, scarring, darker or lighter pigmentary change, recurrence, incomplete removal and infection.

## 2023-07-27 NOTE — PROCEDURE: MIPS QUALITY
Detail Level: Detailed
Quality 431: Preventive Care And Screening: Unhealthy Alcohol Use - Screening: Patient not identified as an unhealthy alcohol user when screened for unhealthy alcohol use using a systematic screening method
Quality 110: Preventive Care And Screening: Influenza Immunization: Influenza Immunization Ordered or Recommended, but not Administered due to system reason
Quality 47: Advance Care Plan: Advance care planning not documented, reason not otherwise specified.
Quality 226: Preventive Care And Screening: Tobacco Use: Screening And Cessation Intervention: Patient screened for tobacco use, is a smoker AND did not receive Cessation Counseling during measurement period or in the six months prior to the measurement period
Quality 111:Pneumonia Vaccination Status For Older Adults: Patient did not receive any pneumococcal conjugate or polysaccharide vaccine on or after their 60th birthday and before the end of the measurement period

## 2023-07-27 NOTE — PROCEDURE: ADDITIONAL NOTES
Detail Level: Simple
Additional Notes: Advised patient to keep an eye these lesions to ensure they heal and do not return.\\nDiscussed with patient that he should begin a round of effudex treatment on his scalp come this Fall. Instructed him to use his previously prescribed effudex twice daily for 5 days.
Render Risk Assessment In Note?: no

## 2024-12-11 ENCOUNTER — APPOINTMENT (OUTPATIENT)
Dept: URBAN - METROPOLITAN AREA CLINIC 259 | Age: 79
Setting detail: DERMATOLOGY
End: 2024-12-11

## 2024-12-11 DIAGNOSIS — L57.0 ACTINIC KERATOSIS: ICD-10-CM

## 2024-12-11 PROCEDURE — OTHER PRESCRIPTION: OTHER

## 2024-12-11 PROCEDURE — 17003 DESTRUCT PREMALG LES 2-14: CPT

## 2024-12-11 PROCEDURE — 99213 OFFICE O/P EST LOW 20 MIN: CPT | Mod: 25

## 2024-12-11 PROCEDURE — OTHER MIPS QUALITY: OTHER

## 2024-12-11 PROCEDURE — OTHER COUNSELING: OTHER

## 2024-12-11 PROCEDURE — OTHER PRESCRIPTION MEDICATION MANAGEMENT: OTHER

## 2024-12-11 PROCEDURE — OTHER LIQUID NITROGEN: OTHER

## 2024-12-11 PROCEDURE — 17000 DESTRUCT PREMALG LESION: CPT

## 2024-12-11 RX ORDER — FLUOROURACIL 100 %
POWDER (GRAM) MISCELLANEOUS
Qty: 30 | Refills: 1 | Status: ERX | COMMUNITY
Start: 2024-12-11

## 2024-12-11 ASSESSMENT — LOCATION SIMPLE DESCRIPTION DERM
LOCATION SIMPLE: LEFT FOREARM
LOCATION SIMPLE: RIGHT FOREHEAD
LOCATION SIMPLE: RIGHT CHEEK
LOCATION SIMPLE: RIGHT FOREARM

## 2024-12-11 ASSESSMENT — LOCATION ZONE DERM
LOCATION ZONE: FACE
LOCATION ZONE: ARM

## 2024-12-11 ASSESSMENT — LOCATION DETAILED DESCRIPTION DERM
LOCATION DETAILED: RIGHT LATERAL FOREHEAD
LOCATION DETAILED: RIGHT DISTAL DORSAL FOREARM
LOCATION DETAILED: RIGHT SUPERIOR LATERAL MALAR CHEEK
LOCATION DETAILED: LEFT PROXIMAL DORSAL FOREARM

## 2024-12-11 NOTE — PROCEDURE: PRESCRIPTION MEDICATION MANAGEMENT
Render In Strict Bullet Format?: No
Detail Level: Zone
Initiate Treatment: fluorouracil (bulk) 100 % powder

## 2024-12-11 NOTE — PROCEDURE: LIQUID NITROGEN
Duration Of Freeze Thaw-Cycle (Seconds): 3
Render Note In Bullet Format When Appropriate: No
Show Applicator Variable?: Yes
Detail Level: Detailed
Post-Care Instructions: I reviewed with the patient in detail post-care instructions. Patient is to wear sunprotection, and avoid picking at any of the treated lesions. Pt may apply Vaseline to crusted or scabbing areas.
Number Of Freeze-Thaw Cycles: 3 freeze-thaw cycles
Consent: The patient's consent was obtained including but not limited to risks of crusting, scabbing, blistering, scarring, darker or lighter pigmentary change, recurrence, incomplete removal and infection.

## 2024-12-11 NOTE — HPI: SKIN LESION
What Type Of Note Output Would You Prefer (Optional)?: Standard Output
Has Your Skin Lesion Been Treated?: not been treated
Is This A New Presentation, Or A Follow-Up?: Skin Lesions
Additional History: Patient reports scaliness throughout his scalp and face. He denies any pain or irritation of any concerning lesions.

## 2025-07-26 ENCOUNTER — APPOINTMENT (OUTPATIENT)
Dept: GENERAL RADIOLOGY | Facility: HOSPITAL | Age: 80
End: 2025-07-26
Attending: PHYSICIAN ASSISTANT
Payer: COMMERCIAL

## 2025-07-26 ENCOUNTER — HOSPITAL ENCOUNTER (EMERGENCY)
Facility: HOSPITAL | Age: 80
Discharge: HOME | End: 2025-07-26
Attending: PHYSICIAN ASSISTANT | Admitting: PHYSICIAN ASSISTANT
Payer: COMMERCIAL

## 2025-07-26 VITALS
DIASTOLIC BLOOD PRESSURE: 68 MMHG | SYSTOLIC BLOOD PRESSURE: 105 MMHG | OXYGEN SATURATION: 96 % | HEART RATE: 103 BPM | RESPIRATION RATE: 18 BRPM | TEMPERATURE: 96.7 F

## 2025-07-26 DIAGNOSIS — L03.115 CELLULITIS OF RIGHT FOOT: Primary | ICD-10-CM

## 2025-07-26 PROCEDURE — 73630 X-RAY EXAM OF FOOT: CPT | Mod: RT

## 2025-07-26 PROCEDURE — 73630 X-RAY EXAM OF FOOT: CPT | Mod: 26 | Performed by: RADIOLOGY

## 2025-07-26 PROCEDURE — 99213 OFFICE O/P EST LOW 20 MIN: CPT | Performed by: PHYSICIAN ASSISTANT

## 2025-07-26 PROCEDURE — G0463 HOSPITAL OUTPT CLINIC VISIT: HCPCS | Performed by: PHYSICIAN ASSISTANT

## 2025-07-26 RX ORDER — CEFADROXIL 500 MG/1
500 CAPSULE ORAL 2 TIMES DAILY
Qty: 14 CAPSULE | Refills: 0 | Status: SHIPPED | OUTPATIENT
Start: 2025-07-26 | End: 2025-08-02

## 2025-07-26 ASSESSMENT — COLUMBIA-SUICIDE SEVERITY RATING SCALE - C-SSRS
6. HAVE YOU EVER DONE ANYTHING, STARTED TO DO ANYTHING, OR PREPARED TO DO ANYTHING TO END YOUR LIFE?: NO
2. HAVE YOU ACTUALLY HAD ANY THOUGHTS OF KILLING YOURSELF IN THE PAST MONTH?: NO
1. IN THE PAST MONTH, HAVE YOU WISHED YOU WERE DEAD OR WISHED YOU COULD GO TO SLEEP AND NOT WAKE UP?: NO

## 2025-07-26 NOTE — ED PROVIDER NOTES
History   No chief complaint on file.    HPI  Bill Almodovar is a 80 year old male who presents to urgent care for evaluation of right foot injury.  Yesterday patient states he accidentally kicked a chair with his right foot and then noticed discomfort at the base of his great toe along with some redness and warmth.  Patient denies any fevers, chills, malaise, previous fractures or dislocations to right foot.    Allergies:  No Known Allergies    Problem List:    Patient Active Problem List    Diagnosis Date Noted    Head trauma, initial encounter 08/26/2022     Priority: Medium    Syncope, unspecified syncope type 08/26/2022     Priority: Medium    Other closed fracture of third thoracic vertebra, initial encounter (H) 08/25/2022     Priority: Medium    Other closed fracture of second thoracic vertebra, initial encounter (H) 08/25/2022     Priority: Medium        Past Medical History:    No past medical history on file.    Past Surgical History:    No past surgical history on file.    Family History:    No family history on file.    Social History:  Marital Status:   [2]        Medications:    acetaminophen (TYLENOL) 325 MG tablet  cefadroxil (DURICEF) 500 MG capsule  hydrochlorothiazide (HYDRODIURIL) 25 MG tablet  Lidocaine (LIDOCARE) 4 % Patch  oxyCODONE (ROXICODONE) 5 MG tablet          Review of Systems   Musculoskeletal:         Right foot injury   All other systems reviewed and are negative.      Physical Exam   BP: 105/68  Pulse: 103  Temp: (!) 96.7  F (35.9  C)  Resp: 18  SpO2: 96 %      Physical Exam  Vitals and nursing note reviewed.   Constitutional:       General: He is not in acute distress.     Appearance: Normal appearance. He is not ill-appearing, toxic-appearing or diaphoretic.   Cardiovascular:      Rate and Rhythm: Regular rhythm.      Heart sounds: Normal heart sounds.   Pulmonary:      Breath sounds: Normal breath sounds.   Musculoskeletal:        Feet:    Feet:      Comments: Right  foot: Patient has tenderness with palpation at base of great toe.  Erythema extending across distal aspect of foot.  Warmth is appreciated.  Patient has normal range of motion, normal strength, CMS intact.  Neurological:      Mental Status: He is alert and oriented to person, place, and time.         ED Course        Procedures             Critical Care time:               Recent Results (from the past 24 hours)   XR Foot Right 3 Views    Narrative    EXAM: XR FOOT RIGHT G/E 3 VIEWS  LOCATION: [facility]  DATE: 07/26/2025    INDICATION: Injury. Pain and erythema over great toe.  COMPARISON: None.      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 Mild osteoarthrosis of the calcaneocuboid joint. Mild osteoarthrosis of the first through fourth TMT joints. There is no evidence of fracture or inflammatory arthropathy or osteomyelitis. No   soft tissue calcifications.         Medications - No data to display    Assessments & Plan (with Medical Decision Making)   #1.  Cellulitis of right foot    Discussed exam findings as well as x-ray result with patient.  Patient is currently afebrile, nontoxic in appearance and vitally stable.  No concern for systemic infection at this time.  Patient does not have a history of gout and given the mechanism of injury patient symptomology is more consistent with cellulitis.  Patient is discharged with prescription of cefadroxil.  Area of infection is outlined with surgical marker and if it is spreading or patient develops fever they are to return to the emergency department immediately.  Patient verbalized understanding and agreement of plan.      I have reviewed the nursing notes.    I have reviewed the findings, diagnosis, plan and need for follow up with the patient.              New Prescriptions    CEFADROXIL (DURICEF) 500 MG CAPSULE    Take 1 capsule (500 mg) by mouth 2 times daily for 7 days.       Final diagnoses:   Cellulitis of right foot        7/26/2025   [facility]       Paddy Heard PA-C  07/26/25 114